# Patient Record
Sex: FEMALE | Race: WHITE | NOT HISPANIC OR LATINO | Employment: FULL TIME | URBAN - METROPOLITAN AREA
[De-identification: names, ages, dates, MRNs, and addresses within clinical notes are randomized per-mention and may not be internally consistent; named-entity substitution may affect disease eponyms.]

---

## 2018-11-05 ENCOUNTER — TRANSFERRED RECORDS (OUTPATIENT)
Dept: HEALTH INFORMATION MANAGEMENT | Facility: CLINIC | Age: 63
End: 2018-11-05

## 2018-12-13 ENCOUNTER — TRANSFERRED RECORDS (OUTPATIENT)
Dept: HEALTH INFORMATION MANAGEMENT | Facility: CLINIC | Age: 63
End: 2018-12-13

## 2019-09-18 ENCOUNTER — TRANSFERRED RECORDS (OUTPATIENT)
Dept: HEALTH INFORMATION MANAGEMENT | Facility: CLINIC | Age: 64
End: 2019-09-18

## 2019-09-23 ENCOUNTER — TELEPHONE (OUTPATIENT)
Dept: DERMATOLOGY | Facility: CLINIC | Age: 64
End: 2019-09-23

## 2019-09-23 ENCOUNTER — DOCUMENTATION ONLY (OUTPATIENT)
Dept: CARE COORDINATION | Facility: CLINIC | Age: 64
End: 2019-09-23

## 2019-11-01 ENCOUNTER — OFFICE VISIT (OUTPATIENT)
Dept: DERMATOLOGY | Facility: CLINIC | Age: 64
End: 2019-11-01
Payer: COMMERCIAL

## 2019-11-01 DIAGNOSIS — L23.9 ALLERGIC CONTACT DERMATITIS, UNSPECIFIED TRIGGER: Primary | ICD-10-CM

## 2019-11-01 RX ORDER — HYDROCORTISONE 25 MG/G
OINTMENT TOPICAL
COMMUNITY
Start: 2018-11-20

## 2019-11-01 RX ORDER — COMPRESSION SOCKS, MEDIUM
EACH MISCELLANEOUS
COMMUNITY
Start: 2018-01-24

## 2019-11-01 RX ORDER — LEVOTHYROXINE SODIUM 25 UG/1
25 TABLET ORAL
COMMUNITY
Start: 2019-09-06

## 2019-11-01 RX ORDER — RIZATRIPTAN BENZOATE 10 MG/1
TABLET ORAL
COMMUNITY
Start: 2019-08-07

## 2019-11-01 RX ORDER — METOPROLOL SUCCINATE 25 MG/1
25 TABLET, EXTENDED RELEASE ORAL
COMMUNITY
Start: 2019-05-16

## 2019-11-01 RX ORDER — BETAMETHASONE DIPROPIONATE 0.05 %
OINTMENT (GRAM) TOPICAL
COMMUNITY
Start: 2018-04-24

## 2019-11-01 RX ORDER — SIMVASTATIN 20 MG
TABLET ORAL
COMMUNITY
Start: 2019-10-20

## 2019-11-01 ASSESSMENT — PAIN SCALES - GENERAL: PAINLEVEL: NO PAIN (0)

## 2019-11-01 NOTE — NURSING NOTE
Dermatology Rooming Note    Kaley Magana's goals for this visit include:   Chief Complaint   Patient presents with     Rash     Kaley is here today for a rash that she has in multiple areas.      DENNY Mcgregor

## 2019-11-01 NOTE — TELEPHONE ENCOUNTER
FUTURE VISIT INFORMATION      FUTURE VISIT INFORMATION:    Date: 11.13.2019    Time: 11:00 a    Location:  Derm  REFERRAL INFORMATION:    Referring provider:  Dr. Lundberg    Referring providers clinic:  Derm    Reason for visit/diagnosis  New Allergy Consult     RECORDS REQUESTED FROM:       Clinic name Comments Records Status Imaging Status   Derm 11.01.2019 - Dr. Lundberg [facility] N/A

## 2019-11-01 NOTE — LETTER
11/1/2019       RE: Kaley Magana  [address] [place] [address]     Dear Colleague,    Thank you for referring your patient, Kaley Magana, to the [facility] DERMATOLOGY at [facility]. Please see a copy of my visit note below.    [facility] Dermatology Note      Dermatology Problem List:  1. Suspected allergic contact dermatitis  - Current treatment: betamethasone 0.05% cream    Encounter Date: Nov 1, 2019    CC:  Chief Complaint   Patient presents with     Rash     Kaley is here today for a rash that she has in multiple areas.          History of Present Illness:  Ms. Kaley Magana is a 64 year old female who presents as a referral from Dr. Katrina Magdaleno at [facility]. She has had vesicles off and on for two years that appear on her distal dorsal hands and periorally that erode and drain, and are pruritic and burn. The lesions do not have a seasonal pattern. She has tried vaseline, betamethasone 0.05% cream, cotton gloves, and switching to all hypoallergenic products, none of which she thinks has helped. She has noticed that she gets more perioral blisters if she eats citrus or spicy foods, so she now avoids preparing and eating these foods. Denies use of packaged cleansing wipes.   She uses gloves when she wipes down her desk at work with Chlorox wipes (she works at the  at a PT office). She also has a history of venous stasis dermatitis of the legs. Other medical history includes hypertension, hyperlipidemia, cataract surgery, and hypothyroid. Medications include metoprolol, simvastatin, and synthroid. She enjoys reading and she has a garden on her patio.    Past Medical History:   There is no problem list on file for this patient.    No past medical history on file.  No past surgical history on file.    Social History:  Patient reports that she has never smoked. She has never used smokeless tobacco. She works at the   at a PT office.    Family History:  No family history on file. Patient states her sister had skin cancer. Denies any other dermatological or autoimmune disease in the family.     Medications:  Current Outpatient Medications   Medication Sig Dispense Refill     betamethasone dipropionate (DIPROSONE) 0.05 % external ointment        Elastic Bandages & Supports (FUTURO RESTORING DRESS SOCKS) MISC Use daily for leg swelling.       hydrocortisone 2.5 % ointment        levothyroxine (SYNTHROID/LEVOTHROID) 25 MCG tablet Take 25 mcg by mouth       metoprolol succinate ER (TOPROL-XL) 25 MG 24 hr tablet Take 25 mg by mouth       rizatriptan (MAXALT) 10 MG tablet        simvastatin (ZOCOR) 20 MG tablet        Allergies   Allergen Reactions     Amlodipine Swelling     Doxycycline Rash     Sulfamethoxazole-Trimethoprim Rash         Review of Systems:  -As per HPI  -Allergy/Immunology: No history of nickel or other skin allergy. No history of asthma. Has mild seasonal allergies.  -Const: Denies fevers, chills or changes in weight.   -Heme: Has had night sweats for 6-8 months.  -Constitutional: Otherwise feeling well today, in usual state of health.  -HEENT: Patient denies nonhealing oral sores.  -Skin: As above in HPI. No additional skin concerns.    Physical exam:  Vitals: There were no vitals taken for this visit.  GEN: This is a well developed, well-nourished female in no acute distress, in a pleasant mood.    SKIN: Focused examination of the face, lips, eyelids, hands, fingernails, forearms, arms was performed.  -Allan skin type: I  -There are pinpoint eroded papules on the dorsal finger distal to the DIP joints and on the knuckles.  -There are linear erosions along the skin folds of the hands.  -The nails are longitudinally ridges with some horizontal ridging.  -There are multiple tan stuck on papules on the L medial arm, some of which are erythematous.  -No other lesions of concern on areas examined.      Impression/Plan:  1. Suspect allergic contact dermatitis.  Possible etiologies include cosmetics, shampoo and household cleaning products.    Referral to Dr. Pace for patch testing    Avoid liquid soaps, use bar soaps    Continue to use betamethasone cream as needed    CC Katrina Magdaleno MD  [facility]  [address] [place] [address] on close of this encounter.  Follow-up prn for new or changing lesions.     Staff Involved:  I, Patty Wagner, MS3, saw and examined the patient in the presence of Dr. Osmany Lundberg MD.  The entire visit (except PFSH) was performed by myself with the student acting as a scribe.I verified the history, examined the patient, discussed the above assessment with her and edited the clinical encounter note.  I agree with the treatment plan.  Osmany Lundberg MD  FAAD         Again, thank you for allowing me to participate in the care of your patient.      Sincerely,    Osmany Lundberg MD

## 2019-11-01 NOTE — PROGRESS NOTES
[facility] Dermatology Note      Dermatology Problem List:  1. Suspected allergic contact dermatitis  - Current treatment: betamethasone 0.05% cream    Encounter Date: Nov 1, 2019    CC:  Chief Complaint   Patient presents with     Rash     Kaley is here today for a rash that she has in multiple areas.          History of Present Illness:  Ms. Kaley Magana is a 64 year old female who presents as a referral from Dr. Katrina Magdaleno at [facility]. She has had vesicles off and on for two years that appear on her distal dorsal hands and periorally that erode and drain, and are pruritic and burn. The lesions do not have a seasonal pattern. She has tried vaseline, betamethasone 0.05% cream, cotton gloves, and switching to all hypoallergenic products, none of which she thinks has helped. She has noticed that she gets more perioral blisters if she eats citrus or spicy foods, so she now avoids preparing and eating these foods. Denies use of packaged cleansing wipes.   She uses gloves when she wipes down her desk at work with Chlorox wipes (she works at the  at a PT office). She also has a history of venous stasis dermatitis of the legs. Other medical history includes hypertension, hyperlipidemia, cataract surgery, and hypothyroid. Medications include metoprolol, simvastatin, and synthroid. She enjoys reading and she has a garden on her patio.    Past Medical History:   There is no problem list on file for this patient.    No past medical history on file.  No past surgical history on file.    Social History:  Patient reports that she has never smoked. She has never used smokeless tobacco. She works at the  at a PT office.    Family History:  No family history on file. Patient states her sister had skin cancer. Denies any other dermatological or autoimmune disease in the family.     Medications:  Current Outpatient Medications   Medication Sig Dispense Refill     betamethasone  dipropionate (DIPROSONE) 0.05 % external ointment        Elastic Bandages & Supports (FUTURO RESTORING DRESS SOCKS) MISC Use daily for leg swelling.       hydrocortisone 2.5 % ointment        levothyroxine (SYNTHROID/LEVOTHROID) 25 MCG tablet Take 25 mcg by mouth       metoprolol succinate ER (TOPROL-XL) 25 MG 24 hr tablet Take 25 mg by mouth       rizatriptan (MAXALT) 10 MG tablet        simvastatin (ZOCOR) 20 MG tablet        Allergies   Allergen Reactions     Amlodipine Swelling     Doxycycline Rash     Sulfamethoxazole-Trimethoprim Rash         Review of Systems:  -As per HPI  -Allergy/Immunology: No history of nickel or other skin allergy. No history of asthma. Has mild seasonal allergies.  -Const: Denies fevers, chills or changes in weight.   -Heme: Has had night sweats for 6-8 months.  -Constitutional: Otherwise feeling well today, in usual state of health.  -HEENT: Patient denies nonhealing oral sores.  -Skin: As above in HPI. No additional skin concerns.    Physical exam:  Vitals: There were no vitals taken for this visit.  GEN: This is a well developed, well-nourished female in no acute distress, in a pleasant mood.    SKIN: Focused examination of the face, lips, eyelids, hands, fingernails, forearms, arms was performed.  -Allan skin type: I  -There are pinpoint eroded papules on the dorsal finger distal to the DIP joints and on the knuckles.  -There are linear erosions along the skin folds of the hands.  -The nails are longitudinally ridges with some horizontal ridging.  -There are multiple tan stuck on papules on the L medial arm, some of which are erythematous.  -No other lesions of concern on areas examined.     Impression/Plan:  1. Suspect allergic contact dermatitis.  Possible etiologies include cosmetics, shampoo and household cleaning products.    Referral to Dr. Pace for patch testing    Avoid liquid soaps, use bar soaps    Continue to use betamethasone cream as needed    KEDAR YUEN  MD Sharla  [facility]  [address] [place] [address] on close of this encounter.  Follow-up prn for new or changing lesions.     Staff Involved:  I, Patty Wagner, MS3, saw and examined the patient in the presence of Dr. Osmany Lundberg MD.  The entire visit (except PFSH) was performed by myself with the student acting as a scribe.I verified the history, examined the patient, discussed the above assessment with her and edited the clinical encounter note.  I agree with the treatment plan.  Osmany Lundberg MD  FAAD

## 2019-11-13 ENCOUNTER — OFFICE VISIT (OUTPATIENT)
Dept: ALLERGY | Facility: CLINIC | Age: 64
End: 2019-11-13
Payer: COMMERCIAL

## 2019-11-13 ENCOUNTER — PRE VISIT (OUTPATIENT)
Dept: ALLERGY | Facility: CLINIC | Age: 64
End: 2019-11-13

## 2019-11-13 DIAGNOSIS — Z88.9 ATOPY: ICD-10-CM

## 2019-11-13 DIAGNOSIS — J30.2 SEASONAL ALLERGIC RHINITIS, UNSPECIFIED TRIGGER: ICD-10-CM

## 2019-11-13 DIAGNOSIS — L23.89 ALLERGIC CONTACT DERMATITIS DUE TO OTHER AGENTS: Primary | ICD-10-CM

## 2019-11-13 ASSESSMENT — PAIN SCALES - GENERAL: PAINLEVEL: NO PAIN (0)

## 2019-11-13 NOTE — PATIENT INSTRUCTIONS
Patch Testing Information  What are allergen patch tests?    The test is done to look for skin allergies that may be causing rashes and irritation.    A patch test is a way of identifying whether a substance has caused a delayed reaction with skin inflammation, such as contact eczema or delayed (after days) reactions to drugs.     We will use various types of test compounds, which may include common allergens you may come in contact with in daily life such as preservatives, fragrances or even drugs.     Most of the time we will use standardized, prefabricated test solutions. The choice of the substances and series tested will depend on your history of reactions. Sometimes we will test your own products as well.     In order to avoid severe toxic reactions we need detailed information or safety sheets for each of the test compounds.    The test panels are set up with a small amount of common substances that cause skin allergies. They are taped to your skin with a clear hypoallergenic bandage and reinforced hypoallergenic tape.    The substances are numbered, so it is easy to tell what is causing a skin reaction.  What do I need to do in preparation for the test?    Stop all systemic steroids 1 month prior to the testing.     Stop applying topical steroids to the test area one week prior to the test. It is to use them elsewhere throughout the testing process. If this is not possible then discuss options with the Allergy specialist.    Do not go sunbathing or tanning for one week prior to testing    It is okay to take antihistamines as normal.     Please wear dark colors the week of the test since we will write on you with a dark marker that may transfer and stain clothing or bedding.     Some medications can affect the reactions to allergens during the tests. Therefore reveal all the medications you take to the allergy team. The doctor will discuss the medications with you before the tests.     Eat how you normally  would.    Avoid the following:    You cannot get the test area wet during the entire test period. This means no bathing or swimming the entire test period.    No strenuous exercise that may cause sweating.    Do not scratch the test area, this can cause the allergen to spread and give us false positives.     Avoid exposure to UV irradiation. This means no tanning or UV treatment during the testing period.   What can I expect?    Patch testing is done over three appointments.   o The usual schedule is: Monday (Allergen patches are placed), Wednesday (Patches are removed and skin is examined by the MD), and Friday (Skin is examined by the MD)      If you are allergic, there will be an area of irritation where the test was placed.    Itching or burning at the test site might happen if you are allergic to the allergen.  Do not rub or scratch the test site since this may spread the allergen and possibly cause false positives. If itching or burning is not tolerable please contact the clinic.    The marker we draw on your back with ma take up to 5 weeks to wash off completely. Rubbing alcohol can help speed up this process.     Reactions can occur 1 to 2 weeks after the tests are applied. If this happens please take photos of the area and contact the clinic.  What should I do after the tests are placed?    Keep the area dry. No showering or getting the test area wet from the time we see you at your first visit until after your third appointment. If you get the test area wet you are washing off the test and we could get false negative reactions.    If you notice any of the test patches coming loose put on more tape to re-secure the test.    If the marker that is applied fades you can use a dark pen to carolyn around the panel sites.    Cover the test area when you are outside to avoid any sun exposure while the test is in place.     You can remove the tests only if there is a severe reaction (itch, pain, blistering). Please  report this to your doctor immediately. If you have to remove the tests please carolyn the locations of each test field with a grid so we can identify the allergen.  WHAT ARE THE POSSIBLE RISKS OF PATCH TESTS     If you are allergic to a compound tested you will develop a localized skin reaction similar to your previous reaction, this may take days to develop. These reactions include a formation of red, itchy skin lesions that could be about a centimeter with small vesicles or possibly even blisters. The lesions will fade over time, this may take weeks. The test might leave some skin pigmentation for a few months.     In rare cases a localized reaction to patch testing can become generalized.     The tests with your own products might have some risks because they are not standardized and unanticipated reactions could occur. We need as much information as possible to evaluate if your own products are safe to test and under what conditions. This has to be evaluated for each individual product.   Useful Contact Information    To contact your doctor you can either send a [employer] message or call [phone]    Address  o [address] [place] [address], [address]    If you develop any serious or adverse allergic reaction after office hours please seek immediate medical assistance at the nearest clinic, urgent care, or emergency room.

## 2019-11-13 NOTE — LETTER
11/13/2019         RE: Kaley Magana  [address] [place] [address]        Dear Colleague,    Thank you for referring your patient, Kaley Magana, to the [facility] ALLERGY. Please see a copy of my visit note below.    [facility] Allergy Note  1. Acute and subacute allergic dermatitis  -On hands, arms, perioral, abdomen  -Possibly allergic contact dermatitis    Allergy Problem List:    Specialty Problems     None          CC:   Allergy Consult (Kaley is here for Dermatitis. Referred by . Possible Patch Testing. Fingers, elbows, armpits and stomach have itching and burning.)        Encounter Date: Nov 13, 2019    History of Present Illness:  Ms. Kaley Magana is a 64 year old female who presents as a referral from [facility]. She has had eczema on her hands, abdomen, arms, and around her mouth for the last 2 years. Was started on lisinopril in 2017 to control her blood pressure and she developed leg edema as well as spots on her legs.   A biopsy was taken which revealed venous stasis dermatitis and the lisinopril was discontinued. Currently uses metoprolol and wears compression stockings. While the venous stasis resolved, she developed eczema on other parts of her body. Treats until eczema is resolved but develops eczema again a few days later. She has been using betamethasone 0.05% ointment 2-4 times per day for 3-4 times per week for over a year. She currently has eczema on her left abdomen, left upper arm, right elbow, hands, and under her left nose.   Has eliminated all fragrances from her hygiene care and clothing care to see if there would be any improvement but did not notice any changes. Has tried wearing cotton gloves with lotion with no improvement. She reports that it seems using lotion causes more irritation than improvement. Has tried using Vaseline alone and with betamethasone with no improvement. Has tried continuing to apply betamethasone to areas of eczema  for a few days after resolution without any change in the amount of time for the eczema to reappear. Reports that she has developed blisters with yellow secretions which she took a picture of on her phone. Denies having asthma or allergies.   Has rhinoconjunctivitis throughout the fall which started a few years ago.   Has noticed hand irritation after wearing rubber gloves. Stress causes her eczema to worsen. Dyes hair. Does not pain nails. Daughter has severe allergies.   Patient brought in a list of current medications, supplements, and lotions as of November 2019: metoprolol 25 mg (2+ years), simvastatin 20mg (4+ years, synthroid 25 mcg (3 months), Maxalt 10mg (5+ years prn), betamethasone 0.05% ointment (2+ years, may just be 1+ years), hydrocortisone 2.5% (1+ years), [employer] eye vitamin, multivitamin, biotin, calcium/vitamin D, Blink eye drops, antacids for acid reflux, CeraVe, Vanicream, and Sarna lotion.    Past Medical History:   There is no problem list on file for this patient.    No past medical history on file.    Allergy History:     Allergies   Allergen Reactions     Amlodipine Swelling     Doxycycline Rash     Sulfamethoxazole-Trimethoprim Rash       Social History:  The patient works at the  of a physical therapy office. Patient has the following hobbies or non-occupational exposure: reads, enjoys being on her porch particularly when it is summer.     reports that she has never smoked. She has never used smokeless tobacco.      Family History:  No family history on file.    Medications:  Current Outpatient Medications   Medication Sig Dispense Refill     betamethasone dipropionate (DIPROSONE) 0.05 % external ointment        Elastic Bandages & Supports (FUTURO RESTORING DRESS SOCKS) MISC Use daily for leg swelling.       hydrocortisone 2.5 % ointment        levothyroxine (SYNTHROID/LEVOTHROID) 25 MCG tablet Take 25 mcg by mouth       metoprolol succinate ER (TOPROL-XL) 25 MG 24 hr tablet  Take 25 mg by mouth       rizatriptan (MAXALT) 10 MG tablet        simvastatin (ZOCOR) 20 MG tablet              Review of Systems:  -As per HPI  -Constitutional: The patient denies fatigue, fevers, chills, unintended weight loss, and night sweats.  -HEENT: Patient denies nonhealing oral sores.  -Skin: As above in HPI. No additional skin concerns.    Physical exam:  Vitals: There were no vitals taken for this visit.  GEN: This is a well developed, well-nourished female in no acute distress, in a pleasant mood.    SKIN: Focused examination of the hands, arms, face, and abdomen was performed.  -Erythematous patches of eczema with lichenification on back of fingers and hands.  -Mild erythematous patches of eczema as well as some nummular eczema throughout arms on extensor surface, underneath left nasal ala, and under flanks on abdomen  -No other lesions of concern on areas examined.     Allergy Tests:    Order for PATCH TESTS    [] Outpatient  [] Inpatient: [facility]..../ Bed ....      Skin Atopy (atopic dermatitis) [] Yes   [x] No  Rhinitis/Sinusitis:   [x] Yes   [] No Fall  Allergic Asthma:   [] Yes   [x] No  Food Allergy:   [] Yes   [x] No  Leg ulcers:   [] Yes   [x] No  Hand eczema:   [x] Yes   [] No   Leading hand:   [x] R   [] L       [] Ambidextrous                        Reason for tests (suspected allergy): acute to subacute dermatitis on back of hands, extensor part of arms and perioral  Known previous allergies: none    Standardized panels  [x] Standard panel (40 tests)  [x] Preservatives & Antimicrobials (31 tests)  [x] Emulsifiers & Additives (25 tests)   [x] Perfumes/Flavours & Plants (25 tests)  [x] Hairdresser panel (12 tests)  [x] Rubber Chemicals (22 tests)  [] Plastics (26 tests)  [] Colorants/Dyes/Food additives (20 tests)  [] Metals (implants/dental) (24 tests)  [] Local anaesthetics/NSAIDs (13 tests)  [] Antibiotics & Antimycotics (14 tests)   [] Corticosteroids (15 tests)   [] Photopatch test (62  tests)   [] others: ...      [] Patient's own products: ...    DO NOT test if chemical or biological identity is unknown!     always ask from patient the product information and safety sheets (MSDS)   [x] Atopy screen prick test (Atopic predisposition): ...    [] Patient needs consultation with Allergy team (changes of tests may apply)  [] Tests discussed with Allergy team (can have direct appointment for patch tests)    Impression/Plan:    >>Acute and subacute allergic dermatitis, possibly contact allergic dermatitis      Follow-up on 11/25/2019 for 1st day of patch test.    Staff Physician Comments:  I was present with the medical student who participated in the service and in the documentation of the note. I have verified the history and personally performed the physical exam and medical decision making. I agree with the assessment and plan as documented in the note. I have reviewed and if necessary amended the note.      Castillo Pace MD  Professor  Head of Dermato-Allergy Division  Department of Dermatology  [facility]      I spent a total of 25 minutes face to face with Kaley Izzy during today s office visit. Over 50% of this time was spent counseling the patient and/or coordinating care.  Please see Assessment and Plan for details.    Again, thank you for allowing me to participate in the care of your patient.        Sincerely,        Castillo Pace MD

## 2019-11-13 NOTE — NURSING NOTE
Chief Complaint   Patient presents with     Allergy Consult     Kaley is here for Dermatitis. Referred by . Possible Patch Testing. Fingers, elbows, armpits and stomach have itching and burning.     ARACELIS JONES on 11/13/2019 at 11:14 AM

## 2019-11-14 NOTE — PROGRESS NOTES
[facility] Allergy Note  1. Acute and subacute allergic dermatitis  -On hands, arms, perioral, abdomen  -Possibly allergic contact dermatitis    Allergy Problem List:    Specialty Problems     None          CC:   Allergy Consult (Kaley is here for Dermatitis. Referred by . Possible Patch Testing. Fingers, elbows, armpits and stomach have itching and burning.)        Encounter Date: Nov 13, 2019    History of Present Illness:  Ms. Kaley Magana is a 64 year old female who presents as a referral from [facility]. She has had eczema on her hands, abdomen, arms, and around her mouth for the last 2 years. Was started on lisinopril in 2017 to control her blood pressure and she developed leg edema as well as spots on her legs.   A biopsy was taken which revealed venous stasis dermatitis and the lisinopril was discontinued. Currently uses metoprolol and wears compression stockings. While the venous stasis resolved, she developed eczema on other parts of her body. Treats until eczema is resolved but develops eczema again a few days later. She has been using betamethasone 0.05% ointment 2-4 times per day for 3-4 times per week for over a year. She currently has eczema on her left abdomen, left upper arm, right elbow, hands, and under her left nose.   Has eliminated all fragrances from her hygiene care and clothing care to see if there would be any improvement but did not notice any changes. Has tried wearing cotton gloves with lotion with no improvement. She reports that it seems using lotion causes more irritation than improvement. Has tried using Vaseline alone and with betamethasone with no improvement. Has tried continuing to apply betamethasone to areas of eczema for a few days after resolution without any change in the amount of time for the eczema to reappear. Reports that she has developed blisters with yellow secretions which she took a picture of on her phone. Denies having asthma or  allergies.   Has rhinoconjunctivitis throughout the fall which started a few years ago.   Has noticed hand irritation after wearing rubber gloves. Stress causes her eczema to worsen. Dyes hair. Does not pain nails. Daughter has severe allergies.   Patient brought in a list of current medications, supplements, and lotions as of November 2019: metoprolol 25 mg (2+ years), simvastatin 20mg (4+ years, synthroid 25 mcg (3 months), Maxalt 10mg (5+ years prn), betamethasone 0.05% ointment (2+ years, may just be 1+ years), hydrocortisone 2.5% (1+ years), [employer] eye vitamin, multivitamin, biotin, calcium/vitamin D, Blink eye drops, antacids for acid reflux, CeraVe, Vanicream, and Sarna lotion.    Past Medical History:   There is no problem list on file for this patient.    No past medical history on file.    Allergy History:     Allergies   Allergen Reactions     Amlodipine Swelling     Doxycycline Rash     Sulfamethoxazole-Trimethoprim Rash       Social History:  The patient works at the  of a physical therapy office. Patient has the following hobbies or non-occupational exposure: reads, enjoys being on her porch particularly when it is summer.     reports that she has never smoked. She has never used smokeless tobacco.      Family History:  No family history on file.    Medications:  Current Outpatient Medications   Medication Sig Dispense Refill     betamethasone dipropionate (DIPROSONE) 0.05 % external ointment        Elastic Bandages & Supports (FUTURO RESTORING DRESS SOCKS) MISC Use daily for leg swelling.       hydrocortisone 2.5 % ointment        levothyroxine (SYNTHROID/LEVOTHROID) 25 MCG tablet Take 25 mcg by mouth       metoprolol succinate ER (TOPROL-XL) 25 MG 24 hr tablet Take 25 mg by mouth       rizatriptan (MAXALT) 10 MG tablet        simvastatin (ZOCOR) 20 MG tablet              Review of Systems:  -As per HPI  -Constitutional: The patient denies fatigue, fevers, chills, unintended weight loss,  and night sweats.  -HEENT: Patient denies nonhealing oral sores.  -Skin: As above in HPI. No additional skin concerns.    Physical exam:  Vitals: There were no vitals taken for this visit.  GEN: This is a well developed, well-nourished female in no acute distress, in a pleasant mood.    SKIN: Focused examination of the hands, arms, face, and abdomen was performed.  -Erythematous patches of eczema with lichenification on back of fingers and hands.  -Mild erythematous patches of eczema as well as some nummular eczema throughout arms on extensor surface, underneath left nasal ala, and under flanks on abdomen  -No other lesions of concern on areas examined.     Allergy Tests:    Order for PATCH TESTS    [] Outpatient  [] Inpatient: [facility]..../ Bed ....      Skin Atopy (atopic dermatitis) [] Yes   [x] No  Rhinitis/Sinusitis:   [x] Yes   [] No Fall  Allergic Asthma:   [] Yes   [x] No  Food Allergy:   [] Yes   [x] No  Leg ulcers:   [] Yes   [x] No  Hand eczema:   [x] Yes   [] No   Leading hand:   [x] R   [] L       [] Ambidextrous                        Reason for tests (suspected allergy): acute to subacute dermatitis on back of hands, extensor part of arms and perioral  Known previous allergies: none    Standardized panels  [x] Standard panel (40 tests)  [x] Preservatives & Antimicrobials (31 tests)  [x] Emulsifiers & Additives (25 tests)   [x] Perfumes/Flavours & Plants (25 tests)  [x] Hairdresser panel (12 tests)  [x] Rubber Chemicals (22 tests)  [] Plastics (26 tests)  [] Colorants/Dyes/Food additives (20 tests)  [] Metals (implants/dental) (24 tests)  [] Local anaesthetics/NSAIDs (13 tests)  [] Antibiotics & Antimycotics (14 tests)   [] Corticosteroids (15 tests)   [] Photopatch test (62 tests)   [] others: ...      [] Patient's own products: ...    DO NOT test if chemical or biological identity is unknown!     always ask from patient the product information and safety sheets (MSDS)   [x] Atopy screen prick test  (Atopic predisposition): ...    [] Patient needs consultation with Allergy team (changes of tests may apply)  [] Tests discussed with Allergy team (can have direct appointment for patch tests)    Impression/Plan:    >>Acute and subacute allergic dermatitis, possibly contact allergic dermatitis      Follow-up on 11/25/2019 for 1st day of patch test.    Staff Physician Comments:  I was present with the medical student who participated in the service and in the documentation of the note. I have verified the history and personally performed the physical exam and medical decision making. I agree with the assessment and plan as documented in the note. I have reviewed and if necessary amended the note.      Castillo Pace MD  Professor  Head of Dermato-Allergy Division  Department of Dermatology  [facility]      I spent a total of 25 minutes face to face with Kaley Magana during today s office visit. Over 50% of this time was spent counseling the patient and/or coordinating care.  Please see Assessment and Plan for details.

## 2019-11-18 ENCOUNTER — PRE VISIT (OUTPATIENT)
Dept: DERMATOLOGY | Facility: CLINIC | Age: 64
End: 2019-11-18

## 2019-11-18 NOTE — TELEPHONE ENCOUNTER
Order documented by: ARACELIS JONES  Order reviewed by: Mimi Montenegro LPN   Physician:   Date/time of application: 11/25/2019  Localization of application: Back     STANDARD Series         No Substance 2 days 4 days remarks   1 Jayy Mix [C] - -    2 Colophony - -    3  2-Mercaptobenzothiazole  - -     4 Methylisothiazolinone - -    5 Carba Mix - -    6 Thiuram Mix [A] - -    7 Bisphenol A Epoxy Resin - -    8 W-Iqnq-Hijmneyphvu-Formaldehyde Resin - -    9 Mercapto Mix [A] - -    10 Black Rubber Mix- PPD [B] - -    11 Potassium Dichromate  -  -    12 Balsam of [place] (Myroxylon Pereirae Resin) - -    13 Nickel Sulphate Hexahydrate - -    14 Mixed Dialkyl Thiourea - -    15 Paraben Mix [B] - -    16 Methyldibromo Glutaronitrile - -    17 Fragrance Mix - -    18 2-Bromo-2-Nitropropane-1,3-Diol (Bronopol) - -    19 Lyral - -    20 Tixocortol-21- Pivalate - -    21 Diazolidiyl Urea (Germall II) - -    22 Methyl Methacrylate - -    23 Cobalt (II) Chloride Hexahydrate - -    24 Fragrance Mix II  - -    25 Compositae Mix - -    26 Benzoyl Peroxide - -    27 Bacitracin - -    28 Formaldehyde - -    29 Methylchloroisothiazolinone / Methylisothiazolinone - -    30 Corticosteroid Mix - -    31 Sodium Lauryl Sulfate - -    32 Lanolin Alcohol - -    33 Turpentine - -    34 Cetylstearylalcohol - -    35 Chlorhexidine Dicluconate - -    36 Budenoside - -    37 Imidazolidinyl Urea  - -    38 Ethyl-2 Cyanoacrylate - -    39 Quaternium 15 (Dowicil 200) - -    40 Decyl Glucoside - -      EMULSIFIERS & ADDITIVES        No Substance 2 days 4 days remarks   41 Polyethylene Glycol-400 - -    42 Cocamidopropyl Betaine - -    43 Amerchol L101 - -    44 Propylene Glycol - -    45 Triethanolamine - -    46 Sorbitane Sesquiolate - -    47 Isopropylmyristate - -    48 Polysorbate 80  - -    49 Amidoamine   (Stearamidopropyl Dimethylamine) - -    50 Oleamidopropyl Dimethylamine - -    51 Lauryl Glucoside - -    52 Coconut  Diethanolamide  - -    53 2-Hydroxy-4-Methoxy Benzophenone (Oxybenzone) - -    54 Benzophenone-4 (Sulisobenzon) - -    55 Propolis - -    56 Dexpanthenol - -    57 Carboxymethyl Cellulose Sodium - -    58 Abitol - -    59 Tert-Butylhydroquinone - -    60 Benzyl Salicylate - -     Antioxidant      61 Dodecyl Gallate - -    62 Butylhydroxyanisole (BHA) - -    63 Butylhydroxytoluene (BHT) - -    64 Di-Alpha-Tocopherol (Vit E) - -    65 Propyl Gallate - -      PERFUMES, FLAVORS & PLANTS         No Substance 2 days 4 days remarks   66 Benzyl Salicylate - -    67 Benzyl Cinnamate - -    68 Di-Limonene (Dipentene) - -    69 Cananga Odorata (Christopher White) (I) - -    70 Lichen Acid Mix - -    71 Mentha Piperita Oil (Peppermint Oil) - -    72 Sesquiterpenelactone mix - -    73 Tea Tree Oil, Oxidized - -    74 Wood Tar Mix - -    75 Abietic Acid - -    76 Lavendula Angustifolia Oil (Lavender Oil) - -    77 Camphor  - -     Fragrance Mix I      78 Oakmoss Absolute - -    79 Eugenol - -    80 Geraniol - -    81 Hydroxycitronellal - -    82 Isoeugenol - -    83 Cinnamic Aldehyde - -    84 Cinnamic Alcohol  - -    85 Sorbitane Sesquioleate - -     Fragrance mix II      86 Citronellol - -    87 Alpha-Hexylcinnamic Aldehyde    - -    88 Citral - -    89 Farnesol - -    90 Coumarin - -      PRESERVATIVES & ANTIMICROBIALS         No Substance 2 days 4 days remarks   91  1,2-Benzisothiazoline-3-One, Sodium Salt - -    92  1,3,5-Gordon (2-Hydroxyethyl) - Hexahydrotriazine (Grotan BK) - -    93 7-Assswfnoozeic-0-Nitro-1, 3-Propanediol - -    94  3, 4, 4' - Triclocarban - -    95 4 - Chloro - 3 - Cresol - -    96 4 - Chloro - 4 - Xylenol (PCMX) - -    97 7-Ethylbicyclooxazolidine (Bioban PR9536) - -    98 Benzalkonium Chloride - -    99 Benzyl Alcohol - -    100 Cetalkonium Chloride - -    101 Cetylpyrimidine Chloride  - -    102 Chloroacetamide - -    103 DMDM Hydantoin - -    104 Glutaraldehyde - -    105 Triclosan - -    106 Glyoxal  Trimeric Dihydrate - -    107 Iodopropynyl Butylcarbamate - -    108 Octylisothiazoline - -    109 Iodoform - -    110 (Nitrobutyl) Morpholine/(Ethylnitro-Trimethylene) Dimorpholine (Bioban P 1487) - -    111 Phenoxyethanol - -    112 Phenyl Salicylate - -    113 Povidone Iodine - -    114 Sodium Benzoate - -    115 Sodium Disulfite - -    116 Sorbic Acid - -    117 Thimerosal - -     Parabens      118 Butyl-P-Hydroxybenzoate - -    119 Ethyl-P-Hydroxybenzoate - -    120 Methyl-P-Hydroxybenzoate - -    121 Propyl-P-Hydroxybenzoate - -      RUBBER CHEMICALS         No Substance 2 days 4 days remarks    Carbamate      122 Zinc Bis ( Diethyldithio carbamate ) (ZDEC) - -    123 Zinc Bis (Dibutyldithiocarbamate) - -    124 1,3-Diphenylguanidine (DPG) - -     Thiourea      125 Dibutylthiourea - -    126 Diphenyltiourea - -    127 Thiourea - -     Mercapto chemicals      128 Morpholinyl Mercaptobenzothiazole - -    129 B-Cddgbkjeow-5-Benzothiazyl-Sulfenamide - -    130 Dibenzothiazyl Disulfide - -     Thiuram chemicals      131 Dipentamethylenethiuram Disulfide - -    132 Tetraethylthiuram Disulfide (Disulfiram) - -    133 Tetramethylthiuram Disulfide - -    134 Tetramethyl Thiuram Monosulfide (TMTM) - -     4-Phenylenediamine derivatives      135 N-Isopropyl-N'-Phenyl-P-Phenylenediamine (IPPD) - -    136 Fhhhoxpz-R-Uiffazxvezchqnwm (DPPD) - -     Various Rubber Additives      137 Hydroquinone Monobenzylether  - -    138 Hexamethylenetetramine - -    139 4,4'-Dihydroxybiphenyl - -    140 Cyclohexylthiophtalimide - -    141 Ethylenediamine Dihydrochloride - -    142 N-Phenyl-B-Naphthylamine - -    143 Dodecyl Mercaptan - -      HAIRDRESSER Series         No Substance 2 days 4 days remarks   144 P-Phenylenediamin  -  -    145 P-Toluenediamine Sulphate  -  -    146 Ammonium Thioglycolate - -    147 Ammonium Persulfate - -    148 Resorcinol - -    149 3-Aminophenol - -    150 P-Aminophenol - -    151 Glyceryl Monothioglycolate  - -    152 Zinc Pyrithione  - -    153 Pyrogallol - -    154 P-Aminodiphenylamine - -    155 Dimethylaminopropylamin (DMAPA) - -      Results of patch tests:                         Interpretation:    - Negative                    A    = Allergic      (+) Erythema    TI   = Toxic/irritant   + E + Infiltration    RaP = Relevance at Present     ++ E/I + Papulovesicle   Rpr  = Relevance Previously     +++ E/I/P + Blister     nR   = No Relevance

## 2019-11-25 ENCOUNTER — OFFICE VISIT (OUTPATIENT)
Dept: ALLERGY | Facility: CLINIC | Age: 64
End: 2019-11-25
Payer: COMMERCIAL

## 2019-11-25 DIAGNOSIS — Z88.9 ATOPY: ICD-10-CM

## 2019-11-25 DIAGNOSIS — L23.89 ALLERGIC CONTACT DERMATITIS DUE TO OTHER AGENTS: Primary | ICD-10-CM

## 2019-11-25 ASSESSMENT — PAIN SCALES - GENERAL: PAINLEVEL: NO PAIN (0)

## 2019-11-25 NOTE — PROGRESS NOTES
[facility] Allergy Note    Allergy Clinic  Formerly [facility]  [address] [place] [id number]    Allergy Problem List:  1. Acute and subacute allergic dermatitis  -On hands, arms, perioral, abdomen  -Possibly allergic contact dermatitis    Allergy Problem List:    Specialty Problems     None        CC:   Allergy Testing Followup (Patch testing day #1)      Encounter Date: Nov 25, 2019    History of Present Illness:  Ms. Kaley Magana is a 64 year old female who presents as a referral from [facility]. She has had eczema on her hands, abdomen, arms, and around her mouth for the last 2 years. Was started on lisinopril in 2017 to control her blood pressure and she developed leg edema as well as spots on her legs.   A biopsy was taken which revealed venous stasis dermatitis and the lisinopril was discontinued. Currently uses metoprolol and wears compression stockings. While the venous stasis resolved, she developed eczema on other parts of her body. Treats until eczema is resolved but develops eczema again a few days later. She has been using betamethasone 0.05% ointment 2-4 times per day for 3-4 times per week for over a year. She currently has eczema on her left abdomen, left upper arm, right elbow, hands, and under her left nose.   Has eliminated all fragrances from her hygiene care and clothing care to see if there would be any improvement but did not notice any changes. Has tried wearing cotton gloves with lotion with no improvement. She reports that it seems using lotion causes more irritation than improvement. Has tried using Vaseline alone and with betamethasone with no improvement. Has tried continuing to apply betamethasone to areas of eczema for a few days after resolution without any change in the amount of time for the eczema to reappear. Reports that she has developed blisters with yellow secretions which she took a picture of on her phone.  Denies having asthma or allergies.   Has rhinoconjunctivitis throughout the fall which started a few years ago.   Has noticed hand irritation after wearing rubber gloves. Stress causes her eczema to worsen. Dyes hair. Does not pain nails. Daughter has severe allergies.   Patient brought in a list of current medications, supplements, and lotions as of November 2019: metoprolol 25 mg (2+ years), simvastatin 20mg (4+ years, synthroid 25 mcg (3 months), Maxalt 10mg (5+ years prn), betamethasone 0.05% ointment (2+ years, may just be 1+ years), hydrocortisone 2.5% (1+ years), [employer] eye vitamin, multivitamin, biotin, calcium/vitamin D, Blink eye drops, antacids for acid reflux, CeraVe, Vanicream, and Sarna lotion.    Past Medical History:   There is no problem list on file for this patient.    No past medical history on file.    Allergy History:     Allergies   Allergen Reactions     Amlodipine Swelling     Doxycycline Rash     Sulfamethoxazole-Trimethoprim Rash       Social History:  The patient works at the  of a physical therapy office. Patient has the following hobbies or non-occupational exposure: reads, enjoys being on her porch particularly when it is summer.     reports that she has never smoked. She has never used smokeless tobacco.      Family History:  No family history on file.    Medications:  Current Outpatient Medications   Medication Sig Dispense Refill     betamethasone dipropionate (DIPROSONE) 0.05 % external ointment        Elastic Bandages & Supports (FUTURO RESTORING DRESS SOCKS) MISC Use daily for leg swelling.       hydrocortisone 2.5 % ointment        levothyroxine (SYNTHROID/LEVOTHROID) 25 MCG tablet Take 25 mcg by mouth       metoprolol succinate ER (TOPROL-XL) 25 MG 24 hr tablet Take 25 mg by mouth       rizatriptan (MAXALT) 10 MG tablet        simvastatin (ZOCOR) 20 MG tablet              Review of Systems:  -As per HPI  -Constitutional: The patient denies fatigue, fevers, chills,  unintended weight loss, and night sweats.  -HEENT: Patient denies nonhealing oral sores.  -Skin: As above in HPI. No additional skin concerns.    Physical exam:  Vitals: There were no vitals taken for this visit.  GEN: This is a well developed, well-nourished female in no acute distress, in a pleasant mood.    SKIN: Focused examination of the hands, arms, face, and abdomen was performed.  -Erythematous patches of eczema with lichenification on back of fingers and hands.  -Mild erythematous patches of eczema as well as some nummular eczema throughout arms on extensor surface, underneath left nasal ala, and under flanks on abdomen  -No other lesions of concern on areas examined.     Allergy Tests:    Order for PATCH TESTS    [] Outpatient  [] Inpatient: [facility]..../ Bed ....      Skin Atopy (atopic dermatitis) [] Yes   [x] No  Rhinitis/Sinusitis:   [x] Yes   [] No Fall  Allergic Asthma:   [] Yes   [x] No  Food Allergy:   [] Yes   [x] No  Leg ulcers:   [] Yes   [x] No  Hand eczema:   [x] Yes   [] No   Leading hand:   [x] R   [] L       [] Ambidextrous                        Reason for tests (suspected allergy): acute to subacute dermatitis on back of hands, extensor part of arms and perioral  Known previous allergies: none    Standardized panels  [x] Standard panel (40 tests)  [x] Preservatives & Antimicrobials (31 tests)  [x] Emulsifiers & Additives (25 tests)   [x] Perfumes/Flavours & Plants (25 tests)  [x] Hairdresser panel (12 tests)  [x] Rubber Chemicals (22 tests)  [] Plastics (26 tests)  [] Colorants/Dyes/Food additives (20 tests)  [] Metals (implants/dental) (24 tests)  [] Local anaesthetics/NSAIDs (13 tests)  [] Antibiotics & Antimycotics (14 tests)   [] Corticosteroids (15 tests)   [] Photopatch test (62 tests)   [] others: ...      [] Patient's own products: ...    DO NOT test if chemical or biological identity is unknown!     always ask from patient the product information and safety sheets (MSDS)   [x] Atopy  screen prick test (Atopic predisposition): ...    [] Patient needs consultation with Allergy team (changes of tests may apply)  [] Tests discussed with Allergy team (can have direct appointment for patch tests)    Atopy Screen (Placed 11/25/19 )    No Substance Readings (15 min) Evaluation   POS Histamine 1mg/ml ++    NEG NaCl 0.9% -      No Substance Readings (15 min) Evaluation   1 Alternaria alternata (tenuis)  -    2 Cladosporium herbarum -    3 Aspergillus fumigatus -    4 Penicillium notatum -    5 Dermatophagoides pteronyssinus -    6 Dermatophagoides farinae -    7 Dog epithelium (canis spp) -    8 Cat hair (trinity catus) -    9 Cockroach   (Blatella americana & germanica) -    10 Grass mix midwest   (Nury, Orchard, Redtop, Raul) -    11 Cornel grass (sorghum halepense) -    12 Weed mix   (common Cocklebur, Lamb s quarters, rough redroot Pigweed, Dock/Sorrel) -    13 Mug wort (artemisia vulgare) -    14 Ragweed giant/short (ambrosia spp) -    15 English Plantain (plantago lanceolata) -    16 Tree mix 1 (Pecan, Maple BHR, Oak RVW, american [place], black [place]) -    17 Red cedar (juniperus virginia) -    18 Tree mix 2   (white Mathew, river/red Birch, black Lannon, common Culberson, american Elm) -    19 Box elder/Maple mix (acer spp) -    20 Palmetto shagbark (carya ovata) -       -      Conclusion: Negative atopy screen prick test. No atopic predisposition.    RESULTS & EVALUATION of PATCH TESTS    Patch test readings after     [x] 2 days, [] 3 days [x] 4 days, [] 5 days,    Applied patch tests with results (import here the list of patch tests):  Order documented by: ARACELIS JONES  Order reviewed by: Mimi Montenegro LPN   Physician:   Date/time of application: 11/25/2019  Localization of application: Back >>> no eczema or lesions     STANDARD Series         No Substance 2 days 4 days remarks   1 Jayy Mix [C] - -    2 Colophony - -    3  2-Mercaptobenzothiazole  - -     4  Methylisothiazolinone - -    5 Carba Mix - -    6 Thiuram Mix [A] - -    7 Bisphenol A Epoxy Resin - -    8 K-Yndh-Tdmhnwowmyj-Formaldehyde Resin - -    9 Mercapto Mix [A] - -    10 Black Rubber Mix- PPD [B] - -    11 Potassium Dichromate  -  -    12 Balsam of [place] (Myroxylon Pereirae Resin) - -    13 Nickel Sulphate Hexahydrate - -    14 Mixed Dialkyl Thiourea - -    15 Paraben Mix [B] - -    16 Methyldibromo Glutaronitrile - -    17 Fragrance Mix - -    18 2-Bromo-2-Nitropropane-1,3-Diol (Bronopol) - -    19 Lyral - -    20 Tixocortol-21- Pivalate - -    21 Diazolidiyl Urea (Germall II) - -    22 Methyl Methacrylate - -    23 Cobalt (II) Chloride Hexahydrate NA NA    24 Fragrance Mix II  - -    25 Compositae Mix - -    26 Benzoyl Peroxide - -    27 Bacitracin - -    28 Formaldehyde - -    29 Methylchloroisothiazolinone / Methylisothiazolinone - -    30 Corticosteroid Mix - -    31 Sodium Lauryl Sulfate - -    32 Lanolin Alcohol - -    33 Turpentine - -    34 Cetylstearylalcohol - -    35 Chlorhexidine Dicluconate - -    36 Budenoside - -    37 Imidazolidinyl Urea  - -    38 Ethyl-2 Cyanoacrylate NA NA    39 Quaternium 15 (Dowicil 200) - -    40 Decyl Glucoside - -      EMULSIFIERS & ADDITIVES        No Substance 2 days 4 days remarks   41 Polyethylene Glycol-400 - -    42 Cocamidopropyl Betaine - -    43 Amerchol L101 NA NA    44 Propylene Glycol - -    45 Triethanolamine - -    46 Sorbitane Sesquiolate - -    47 Isopropylmyristate - -    48 Polysorbate 80  - -    49 Amidoamine   (Stearamidopropyl Dimethylamine) - -    50 Oleamidopropyl Dimethylamine - -    51 Lauryl Glucoside - -    52 Coconut Diethanolamide  - -    53 2-Hydroxy-4-Methoxy Benzophenone (Oxybenzone) - -    54 Benzophenone-4 (Sulisobenzon) - -    55 Propolis - -    56 Dexpanthenol - -    57 Carboxymethyl Cellulose Sodium - -    58 Abitol - -    59 Tert-Butylhydroquinone - -    60 Benzyl Salicylate - -     Antioxidant      61 Dodecyl Gallate - -     62 Butylhydroxyanisole (BHA) - -    63 Butylhydroxytoluene (BHT) - -    64 Di-Alpha-Tocopherol (Vit E) - -    65 Propyl Gallate - -      PERFUMES, FLAVORS & PLANTS         No Substance 2 days 4 days remarks   66 Benzyl Salicylate - -    67 Benzyl Cinnamate - -    68 Di-Limonene (Dipentene) NA NA    69 Cananga Odorata (Christopher White) (I) - -    70 Lichen Acid Mix - -    71 Mentha Piperita Oil (Peppermint Oil) - -    72 Sesquiterpenelactone mix - -    73 Tea Tree Oil, Oxidized - -    74 Wood Tar Mix - -    75 Abietic Acid NA NA    76 Lavendula Angustifolia Oil (Lavender Oil) - -    77 Camphor  NA NA     Fragrance Mix I      78 Oakmoss Absolute - -    79 Eugenol - -    80 Geraniol - -    81 Hydroxycitronellal - -    82 Isoeugenol - -    83 Cinnamic Aldehyde - -    84 Cinnamic Alcohol  - -    85 Sorbitane Sesquioleate NA NA     Fragrance mix II      86 Citronellol - -    87 Alpha-Hexylcinnamic Aldehyde    - -    88 Citral - -    89 Farnesol - -    90 Coumarin - -      PRESERVATIVES & ANTIMICROBIALS         No Substance 2 days 4 days remarks   91  1,2-Benzisothiazoline-3-One, Sodium Salt - -    92  1,3,5-Gordon (2-Hydroxyethyl) - Hexahydrotriazine (Grotan BK) - -    93 1-Guaodqjmztlzl-6-Nitro-1, 3-Propanediol - -    94  3, 4, 4' - Triclocarban - -    95 4 - Chloro - 3 - Cresol - -    96 4 - Chloro - 4 - Xylenol (PCMX) - -    97 7-Ethylbicyclooxazolidine (Bioban UP5991) - -    98 Benzalkonium Chloride - -    99 Benzyl Alcohol - -    100 Cetalkonium Chloride - -    101 Cetylpyrimidine Chloride  - -    102 Chloroacetamide - -    103 DMDM Hydantoin - -    104 Glutaraldehyde - -    105 Triclosan NA NA    106 Glyoxal Trimeric Dihydrate - -    107 Iodopropynyl Butylcarbamate - -    108 Octylisothiazoline - -    109 Iodoform - -    110 (Nitrobutyl) Morpholine/(Ethylnitro-Trimethylene) Dimorpholine ([facility] P 1487) - -    111 Phenoxyethanol - -    112 Phenyl Salicylate - -    113 Povidone Iodine - -    114 Sodium Benzoate - -    115  Sodium Disulfite - -    116 Sorbic Acid NA NA    117 Thimerosal - -     Parabens      118 Butyl-P-Hydroxybenzoate - -    119 Ethyl-P-Hydroxybenzoate - -    120 Methyl-P-Hydroxybenzoate - -    121 Propyl-P-Hydroxybenzoate - -      RUBBER CHEMICALS         No Substance 2 days 4 days remarks    Carbamate      122 Zinc Bis ( Diethyldithio carbamate ) (ZDEC) - -    123 Zinc Bis (Dibutyldithiocarbamate) - -    124 1,3-Diphenylguanidine (DPG) - -     Thiourea      125 Dibutylthiourea - -    126 Diphenyltiourea - -    127 Thiourea - -     Mercapto chemicals      128 Morpholinyl Mercaptobenzothiazole - -    129 V-Pdxeolslwm-6-Benzothiazyl-Sulfenamide - -    130 Dibenzothiazyl Disulfide - -     Thiuram chemicals      131 Dipentamethylenethiuram Disulfide - -    132 Tetraethylthiuram Disulfide (Disulfiram) - -    133 Tetramethylthiuram Disulfide - -    134 Tetramethyl Thiuram Monosulfide (TMTM) - -     4-Phenylenediamine derivatives      135 N-Isopropyl-N'-Phenyl-P-Phenylenediamine (IPPD) - -    136 Dicrrqvb-C-Aaalzqlcyebcwlpx (DPPD) - -     Various Rubber Additives      137 Hydroquinone Monobenzylether  - -    138 Hexamethylenetetramine - -    139 4,4'-Dihydroxybiphenyl - -    140 Cyclohexylthiophtalimide - -    141 Ethylenediamine Dihydrochloride - -    142 N-Phenyl-B-Naphthylamine - -    143 Dodecyl Mercaptan - -      HAIRDRESSER Series         No Substance 2 days 4 days remarks   144 P-Phenylenediamin  -  -    145 P-Toluenediamine Sulphate  -  -    146 Ammonium Thioglycolate - -    147 Ammonium Persulfate - -    148 Resorcinol - -    149 3-Aminophenol - -    150 P-Aminophenol - -    151 Glyceryl Monothioglycolate - -    152 Zinc Pyrithione  - -    153 Pyrogallol - -    154 P-Aminodiphenylamine - -    155 Dimethylaminopropylamin (DMAPA) - -      Results of patch tests:                         Interpretation:    - Negative                    A    = Allergic      (+) Erythema    TI   = Toxic/irritant   + E +  Infiltration    RaP = Relevance at Present     ++ E/I + Papulovesicle   Rpr  = Relevance Previously     +++ E/I/P + Blister     nR   = No Relevance    [] No relevant allergic reaction observed    [] Allergic reaction diagnosed against: see later      Interpretation/ remarks:   See later    [] Patient information given   [] ACDS CAMP information's (# ....) to following compounds: .....   [] General information's to following compounds: ......    ==> final Diagnosis:    >>Acute and subacute allergic dermatitis, possibly contact allergic dermatitis    ==> Treatment prescribed/Plan:  See later      These conclusions are made at the best of one's knowledge and belief based on the provided evidence such as patient's history and allergy test results and they can change over time or can be incomplete because of missing information's.    Follow-up for ongoing patch test.      Staff Involved:    Scribe Disclosure  I, aPtty Mireles, am serving as a scribe to document services personally performed by Dr. Castillo Pace, based on data collection and the provider's statements to me.     I spent a total of 10 minutes face to face with Kaley Magana during today s office visit. Over 50% of this time was spent counseling the patient and/or coordinating care.  Please see Assessment and Plan for details.  This excludes any time spent performing patch tests

## 2019-11-26 NOTE — NURSING NOTE
Chief Complaint   Patient presents with     Allergy Testing Followup     Patch testing day #1     Kell Sanchez LPN

## 2019-11-26 NOTE — PROGRESS NOTES
Patient had 148 patch tests placed this visit.    Standard panel (38 tests)    Preservatives & Antimicrobials (27 tests)    Emulsifiers & Additives (24 tests)     Perfumes/Flavours & Plants (25 tests)    Hairdresser panel (12 tests)    Rubber Chemicals (22 tests)

## 2019-11-27 ENCOUNTER — OFFICE VISIT (OUTPATIENT)
Dept: ALLERGY | Facility: CLINIC | Age: 64
End: 2019-11-27
Payer: COMMERCIAL

## 2019-11-27 DIAGNOSIS — Z88.9 ATOPY: ICD-10-CM

## 2019-11-27 DIAGNOSIS — L23.89 ALLERGIC CONTACT DERMATITIS DUE TO OTHER AGENTS: Primary | ICD-10-CM

## 2019-11-27 ASSESSMENT — PAIN SCALES - GENERAL: PAINLEVEL: NO PAIN (0)

## 2019-11-27 NOTE — NURSING NOTE
Allergy Rooming Note    Kaley Magana's goals for this visit include:   Chief Complaint   Patient presents with     Allergy Testing Followup     Kaley is here for allergy testing day 3     Mimi Montenegro LPN

## 2019-11-27 NOTE — PROGRESS NOTES
[facility] Allergy Note    Allergy Clinic  [facility]  [address] [place] [id number]    Allergy Problem List:  1. Acute and subacute allergic dermatitis  -On hands, arms, perioral, abdomen  -Possibly allergic contact dermatitis    Allergy Problem List:    Specialty Problems     None        CC:   Allergy Testing Followup (Kaley is here for allergy testing day 3)      Encounter Date: Nov 27, 2019    History of Present Illness:  Ms. Kaley Magana is a 64 year old female who presents as a referral from [facility]. She has had eczema on her hands, abdomen, arms, and around her mouth for the last 2 years. Was started on lisinopril in 2017 to control her blood pressure and she developed leg edema as well as spots on her legs.   A biopsy was taken which revealed venous stasis dermatitis and the lisinopril was discontinued. Currently uses metoprolol and wears compression stockings. While the venous stasis resolved, she developed eczema on other parts of her body. Treats until eczema is resolved but develops eczema again a few days later. She has been using betamethasone 0.05% ointment 2-4 times per day for 3-4 times per week for over a year. She currently has eczema on her left abdomen, left upper arm, right elbow, hands, and under her left nose.   Has eliminated all fragrances from her hygiene care and clothing care to see if there would be any improvement but did not notice any changes. Has tried wearing cotton gloves with lotion with no improvement. She reports that it seems using lotion causes more irritation than improvement. Has tried using Vaseline alone and with betamethasone with no improvement. Has tried continuing to apply betamethasone to areas of eczema for a few days after resolution without any change in the amount of time for the eczema to reappear. Reports that she has developed blisters with yellow secretions which she took a picture  of on her phone. Denies having asthma or allergies.   Has rhinoconjunctivitis throughout the fall which started a few years ago.   Has noticed hand irritation after wearing rubber gloves. Stress causes her eczema to worsen. Dyes hair. Does not pain nails. Daughter has severe allergies.   Patient brought in a list of current medications, supplements, and lotions as of November 2019: metoprolol 25 mg (2+ years), simvastatin 20mg (4+ years, synthroid 25 mcg (3 months), Maxalt 10mg (5+ years prn), betamethasone 0.05% ointment (2+ years, may just be 1+ years), hydrocortisone 2.5% (1+ years), [employer] eye vitamin, multivitamin, biotin, calcium/vitamin D, Blink eye drops, antacids for acid reflux, CeraVe, Vanicream, and Sarna lotion.    Past Medical History:   There is no problem list on file for this patient.    No past medical history on file.    Allergy History:     Allergies   Allergen Reactions     Amlodipine Swelling     Doxycycline Rash     Sulfamethoxazole-Trimethoprim Rash       Social History:  The patient works at the  of a physical therapy office. Patient has the following hobbies or non-occupational exposure: reads, enjoys being on her porch particularly when it is summer.     reports that she has never smoked. She has never used smokeless tobacco.      Family History:  No family history on file.    Medications:  Current Outpatient Medications   Medication Sig Dispense Refill     betamethasone dipropionate (DIPROSONE) 0.05 % external ointment        Elastic Bandages & Supports (FUTURO RESTORING DRESS SOCKS) MISC Use daily for leg swelling.       hydrocortisone 2.5 % ointment        levothyroxine (SYNTHROID/LEVOTHROID) 25 MCG tablet Take 25 mcg by mouth       metoprolol succinate ER (TOPROL-XL) 25 MG 24 hr tablet Take 25 mg by mouth       rizatriptan (MAXALT) 10 MG tablet        simvastatin (ZOCOR) 20 MG tablet              Review of Systems:  -As per HPI  -Constitutional: The patient denies  fatigue, fevers, chills, unintended weight loss, and night sweats.  -HEENT: Patient denies nonhealing oral sores.  -Skin: As above in HPI. No additional skin concerns.    Physical exam:  Vitals: There were no vitals taken for this visit.  GEN: This is a well developed, well-nourished female in no acute distress, in a pleasant mood.    SKIN: Focused examination of the hands, arms, face, and abdomen was performed.  -Erythematous patches of eczema with lichenification on back of fingers and hands.  -Mild erythematous patches of eczema as well as some nummular eczema throughout arms on extensor surface, underneath left nasal ala, and under flanks on abdomen  -No other lesions of concern on areas examined.     Allergy Tests:    Order for PATCH TESTS    [] Outpatient  [] Inpatient: [facility]..../ Bed ....      Skin Atopy (atopic dermatitis) [] Yes   [x] No  Rhinitis/Sinusitis:   [x] Yes   [] No Fall  Allergic Asthma:   [] Yes   [x] No  Food Allergy:   [] Yes   [x] No  Leg ulcers:   [] Yes   [x] No  Hand eczema:   [x] Yes   [] No   Leading hand:   [x] R   [] L       [] Ambidextrous                        Reason for tests (suspected allergy): acute to subacute dermatitis on back of hands, extensor part of arms and perioral  Known previous allergies: none    Standardized panels  [x] Standard panel (40 tests)  [x] Preservatives & Antimicrobials (31 tests)  [x] Emulsifiers & Additives (25 tests)   [x] Perfumes/Flavours & Plants (25 tests)  [x] Hairdresser panel (12 tests)  [x] Rubber Chemicals (22 tests)  [] Plastics (26 tests)  [] Colorants/Dyes/Food additives (20 tests)  [] Metals (implants/dental) (24 tests)  [] Local anaesthetics/NSAIDs (13 tests)  [] Antibiotics & Antimycotics (14 tests)   [] Corticosteroids (15 tests)   [] Photopatch test (62 tests)   [] others: ...      [] Patient's own products: ...    DO NOT test if chemical or biological identity is unknown!     always ask from patient the product information and safety  sheets (MSDS)   [x] Atopy screen prick test (Atopic predisposition): ...    [] Patient needs consultation with Allergy team (changes of tests may apply)  [] Tests discussed with Allergy team (can have direct appointment for patch tests)    Atopy Screen (Placed 11/25/19 )    No Substance Readings (15 min) Evaluation   POS Histamine 1mg/ml ++    NEG NaCl 0.9% -      No Substance Readings (15 min) Evaluation   1 Alternaria alternata (tenuis)  -    2 Cladosporium herbarum -    3 Aspergillus fumigatus -    4 Penicillium notatum -    5 Dermatophagoides pteronyssinus -    6 Dermatophagoides farinae -    7 Dog epithelium (canis spp) -    8 Cat hair (trinity catus) -    9 Cockroach   (Blatella americana & germanica) -    10 Grass mix midwest   (Nury, Orchard, Redtop, Raul) -    11 Cornel grass (sorghum halepense) -    12 Weed mix   (common Cocklebur, Lamb s quarters, rough redroot Pigweed, Dock/Sorrel) -    13 Mug wort (artemisia vulgare) -    14 Ragweed giant/short (ambrosia spp) -    15 English Plantain (plantago lanceolata) -    16 Tree mix 1 (Pecan, Maple BHR, Oak RVW, american [place], black [place]) -    17 Red cedar (juniperus virginia) -    18 Tree mix 2   (white Mathew, river/red Birch, black Rueter, common [place], american Elm) -    19 Box elder/Maple mix (acer spp) -    20 Kauai shagbark (carya ovata) -       -      Conclusion: Negative atopy screen prick test. No atopic predisposition.    RESULTS & EVALUATION of PATCH TESTS    Patch test readings after     [x] 2 days, [] 3 days [x] 4 days, [] 5 days,    Applied patch tests with results (import here the list of patch tests):  Order documented by: ARACELIS JONES  Order reviewed by: Mimi Montenegro LPN   Physician:   Date/time of application: 11/25/2019  Localization of application: Back >>> no eczema or lesions     STANDARD Series         No Substance 2 days 4 days remarks   1 Jayy Mix [C] - -    2 Colophony - -    3   2-Mercaptobenzothiazole  - -     4 Methylisothiazolinone - -    5 Carba Mix - -    6 Thiuram Mix [A] - -    7 Bisphenol A Epoxy Resin - -    8 I-Ogij-Wyrqqefllpf-Formaldehyde Resin - -    9 Mercapto Mix [A] - -    10 Black Rubber Mix- PPD [B] - -    11 Potassium Dichromate  +  -    12 Balsam of [place] (Myroxylon Pereirae Resin) - -    13 Nickel Sulphate Hexahydrate - -    14 Mixed Dialkyl Thiourea - -    15 Paraben Mix [B] - -    16 Methyldibromo Glutaronitrile + -    17 Fragrance Mix - -    18 2-Bromo-2-Nitropropane-1,3-Diol (Bronopol) - -    19 Lyral - -    20 Tixocortol-21- Pivalate - -    21 Diazolidiyl Urea (Germall II) - -    22 Methyl Methacrylate - -    23 Cobalt (II) Chloride Hexahydrate NA NA    24 Fragrance Mix II  - -    25 Compositae Mix - -    26 Benzoyl Peroxide - -    27 Bacitracin + -    28 Formaldehyde - -    29 Methylchloroisothiazolinone / Methylisothiazolinone - -    30 Corticosteroid Mix - -    31 Sodium Lauryl Sulfate - -    32 Lanolin Alcohol - -    33 Turpentine - -    34 Cetylstearylalcohol - -    35 Chlorhexidine Dicluconate + -    36 Budenoside - -    37 Imidazolidinyl Urea  - -    38 Ethyl-2 Cyanoacrylate NA NA    39 Quaternium 15 (Dowicil 200) - -    40 Decyl Glucoside - -      EMULSIFIERS & ADDITIVES        No Substance 2 days 4 days remarks   41 Polyethylene Glycol-400 - -    42 Cocamidopropyl Betaine - -    43 Amerchol L101 NA NA    44 Propylene Glycol - -    45 Triethanolamine - -    46 Sorbitane Sesquiolate - -    47 Isopropylmyristate - -    48 Polysorbate 80  - -    49 Amidoamine   (Stearamidopropyl Dimethylamine) - -    50 Oleamidopropyl Dimethylamine - -    51 Lauryl Glucoside - -    52 Coconut Diethanolamide  - -    53 2-Hydroxy-4-Methoxy Benzophenone (Oxybenzone) - -    54 Benzophenone-4 (Sulisobenzon) - -    55 Propolis - -    56 Dexpanthenol - -    57 Carboxymethyl Cellulose Sodium - -    58 Abitol - -    59 Tert-Butylhydroquinone - -    60 Benzyl Salicylate - -      Antioxidant      61 Dodecyl Gallate - -    62 Butylhydroxyanisole (BHA) - -    63 Butylhydroxytoluene (BHT) - -    64 Di-Alpha-Tocopherol (Vit E) - -    65 Propyl Gallate - -      PERFUMES, FLAVORS & PLANTS         No Substance 2 days 4 days remarks   66 Benzyl Salicylate - -    67 Benzyl Cinnamate - -    68 Di-Limonene (Dipentene) NA NA    69 Cananga Odorata (Christopher White) (I) - -    70 Lichen Acid Mix - -    71 Mentha Piperita Oil (Peppermint Oil) - -    72 Sesquiterpenelactone mix - -    73 Tea Tree Oil, Oxidized - -    74 Wood Tar Mix - -    75 Abietic Acid NA NA    76 Lavendula Angustifolia Oil (Lavender Oil) - -    77 Camphor  NA NA     Fragrance Mix I      78 Oakmoss Absolute - -    79 Eugenol - -    80 Geraniol - -    81 Hydroxycitronellal - -    82 Isoeugenol - -    83 Cinnamic Aldehyde - -    84 Cinnamic Alcohol  - -    85 Sorbitane Sesquioleate NA NA     Fragrance mix II      86 Citronellol - -    87 Alpha-Hexylcinnamic Aldehyde    - -    88 Citral - -    89 Farnesol - -    90 Coumarin - -      PRESERVATIVES & ANTIMICROBIALS         No Substance 2 days 4 days remarks   91  1,2-Benzisothiazoline-3-One, Sodium Salt - -    92  1,3,5-Gordon (2-Hydroxyethyl) - Hexahydrotriazine (Grotan BK) - -    93 7-Pweolohuomstl-2-Nitro-1, 3-Propanediol - -    94  3, 4, 4' - Triclocarban - -    95 4 - Chloro - 3 - Cresol - -    96 4 - Chloro - 4 - Xylenol (PCMX) - -    97 7-Ethylbicyclooxazolidine (Bioban [id number]) - -    98 Benzalkonium Chloride - -    99 Benzyl Alcohol - -    100 Cetalkonium Chloride - -    101 Cetylpyrimidine Chloride  - -    102 Chloroacetamide - -    103 DMDM Hydantoin - -    104 Glutaraldehyde - -    105 Triclosan NA NA    106 Glyoxal Trimeric Dihydrate - -    107 Iodopropynyl Butylcarbamate - -    108 Octylisothiazoline - -    109 Iodoform - -    110 (Nitrobutyl) Morpholine/(Ethylnitro-Trimethylene) Dimorpholine ([facility] P 1487) - -    111 Phenoxyethanol - -    112 Phenyl Salicylate - -    113 Povidone  Iodine - -    114 Sodium Benzoate - -    115 Sodium Disulfite - -    116 Sorbic Acid NA NA    117 Thimerosal - -     Parabens      118 Butyl-P-Hydroxybenzoate - -    119 Ethyl-P-Hydroxybenzoate - -    120 Methyl-P-Hydroxybenzoate - -    121 Propyl-P-Hydroxybenzoate - -      RUBBER CHEMICALS         No Substance 2 days 4 days remarks    Carbamate      122 Zinc Bis ( Diethyldithio carbamate ) (ZDEC) - -    123 Zinc Bis (Dibutyldithiocarbamate) - -    124 1,3-Diphenylguanidine (DPG) - -     Thiourea      125 Dibutylthiourea - -    126 Diphenyltiourea - -    127 Thiourea - -     Mercapto chemicals      128 Morpholinyl Mercaptobenzothiazole - -    129 R-Iqeswcugvp-5-Benzothiazyl-Sulfenamide - -    130 Dibenzothiazyl Disulfide - -     Thiuram chemicals      131 Dipentamethylenethiuram Disulfide - -    132 Tetraethylthiuram Disulfide (Disulfiram) - -    133 Tetramethylthiuram Disulfide - -    134 Tetramethyl Thiuram Monosulfide (TMTM) - -     4-Phenylenediamine derivatives      135 N-Isopropyl-N'-Phenyl-P-Phenylenediamine (IPPD) - -    136 Vtlrgtio-D-Nbwvuducwlmqdcas (DPPD) - -     Various Rubber Additives      137 Hydroquinone Monobenzylether  - -    138 Hexamethylenetetramine - -    139 4,4'-Dihydroxybiphenyl - -    140 Cyclohexylthiophtalimide - -    141 Ethylenediamine Dihydrochloride - -    142 N-Phenyl-B-Naphthylamine - -    143 Dodecyl Mercaptan - -      HAIRDRESSER Series         No Substance 2 days 4 days remarks   144 P-Phenylenediamin  -  -    145 P-Toluenediamine Sulphate  -  -    146 Ammonium Thioglycolate - -    147 Ammonium Persulfate - -    148 Resorcinol - -    149 3-Aminophenol - -    150 P-Aminophenol - -    151 Glyceryl Monothioglycolate - -    152 Zinc Pyrithione  - -    153 Pyrogallol - -    154 P-Aminodiphenylamine - -    155 Dimethylaminopropylamin (DMAPA) - -      Results of patch tests:                         Interpretation:    - Negative                    A    = Allergic      (+) Erythema    TI    = Toxic/irritant   + E + Infiltration    RaP = Relevance at Present     ++ E/I + Papulovesicle   Rpr  = Relevance Previously     +++ E/I/P + Blister     nR   = No Relevance    [x] No relevant allergic reaction observed    [] Allergic reaction diagnosed against: see later      Interpretation/ remarks:   See later    [] Patient information given   [] ACDS CAMP information's (# ....) to following compounds: .....   [] General information's to following compounds: ......    ==> final Diagnosis:    >>Acute and subacute allergic dermatitis, possibly contact allergic dermatitis    ==> Treatment prescribed/Plan:  See later      These conclusions are made at the best of one's knowledge and belief based on the provided evidence such as patient's history and allergy test results and they can change over time or can be incomplete because of missing information's.    Follow-up for ongoing patch test.      Scribe Disclosure  I, Patty Duran, am serving as a scribe to document services personally performed by Dr. Castillo Pace MD, based on data collection and the provider's statements to me.    I spent a total of 8 minutes face to face with Kaley Magana during today s office visit. Over 50% of this time was spent counseling the patient and/or coordinating care. Please see Assessment and Plan for details.

## 2019-11-29 ENCOUNTER — OFFICE VISIT (OUTPATIENT)
Dept: ALLERGY | Facility: CLINIC | Age: 64
End: 2019-11-29
Payer: COMMERCIAL

## 2019-11-29 ENCOUNTER — APPOINTMENT (OUTPATIENT)
Dept: LAB | Facility: CLINIC | Age: 64
End: 2019-11-29
Payer: COMMERCIAL

## 2019-11-29 DIAGNOSIS — L20.89 OTHER ATOPIC DERMATITIS: Primary | ICD-10-CM

## 2019-11-29 DIAGNOSIS — L23.89 ALLERGIC CONTACT DERMATITIS DUE TO OTHER AGENTS: ICD-10-CM

## 2019-11-29 RX ORDER — TACROLIMUS 1 MG/G
OINTMENT TOPICAL 2 TIMES DAILY
Qty: 60 G | Refills: 3 | Status: SHIPPED | OUTPATIENT
Start: 2019-11-29 | End: 2019-12-02

## 2019-11-29 ASSESSMENT — PAIN SCALES - GENERAL: PAINLEVEL: NO PAIN (0)

## 2019-11-29 NOTE — PROGRESS NOTES
[facility] Allergy Note    Allergy Clinic  Formerly [facility]  [facility]  [address] [place] [id number]    Allergy Problem List:  1. Acute and subacute allergic dermatitis  -On hands, arms, perioral, abdomen  -Possibly allergic contact dermatitis    Allergy Problem List:    Specialty Problems     None        CC:   Allergy Testing Followup (Kaley is here for Patch Testing Day 5.)      Encounter Date: Nov 29, 2019    History of Present Illness:  Ms. Kaley Magana is a 64 year old female who presents as a referral from [facility]. She has had eczema on her hands, abdomen, arms, and around her mouth for the last 2 years. Was started on lisinopril in 2017 to control her blood pressure and she developed leg edema as well as spots on her legs.   A biopsy was taken which revealed venous stasis dermatitis and the lisinopril was discontinued. Currently uses metoprolol and wears compression stockings. While the venous stasis resolved, she developed eczema on other parts of her body. Treats until eczema is resolved but develops eczema again a few days later. She has been using betamethasone 0.05% ointment 2-4 times per day for 3-4 times per week for over a year. She currently has eczema on her left abdomen, left upper arm, right elbow, hands, and under her left nose.   Has eliminated all fragrances from her hygiene care and clothing care to see if there would be any improvement but did not notice any changes. Has tried wearing cotton gloves with lotion with no improvement. She reports that it seems using lotion causes more irritation than improvement. Has tried using Vaseline alone and with betamethasone with no improvement. Has tried continuing to apply betamethasone to areas of eczema for a few days after resolution without any change in the amount of time for the eczema to reappear. Reports that she has developed blisters with yellow secretions which she took a picture  of on her phone. Denies having asthma or allergies.   Has rhinoconjunctivitis throughout the fall which started a few years ago.   Has noticed hand irritation after wearing rubber gloves. Stress causes her eczema to worsen. Dyes hair. Does not pain nails. Daughter has severe allergies.   Patient brought in a list of current medications, supplements, and lotions as of November 2019: metoprolol 25 mg (2+ years), simvastatin 20mg (4+ years, synthroid 25 mcg (3 months), Maxalt 10mg (5+ years prn), betamethasone 0.05% ointment (2+ years, may just be 1+ years), hydrocortisone 2.5% (1+ years), [employer] eye vitamin, multivitamin, biotin, calcium/vitamin D, Blink eye drops, antacids for acid reflux, CeraVe, Vanicream, and Sarna lotion.    Hx since 11/27/19:  The patient comes in today for patch testing day 5. Reports that last night she developed a lesion on the L thumb that she would like examined today. The patient is otherwise feeling well overall. No other allergy concerns at this time.       Past Medical History:   There is no problem list on file for this patient.    No past medical history on file.    Allergy History:     Allergies   Allergen Reactions     Amlodipine Swelling     Doxycycline Rash     Sulfamethoxazole-Trimethoprim Rash       Social History:  The patient works at the  of a physical therapy office. Patient has the following hobbies or non-occupational exposure: reads, enjoys being on her porch particularly when it is summer.     reports that she has never smoked. She has never used smokeless tobacco.      Family History:  No family history on file.    Medications:  Current Outpatient Medications   Medication Sig Dispense Refill     betamethasone dipropionate (DIPROSONE) 0.05 % external ointment        Elastic Bandages & Supports (FUTURO RESTORING DRESS SOCKS) MISC Use daily for leg swelling.       hydrocortisone 2.5 % ointment        levothyroxine (SYNTHROID/LEVOTHROID) 25 MCG tablet Take 25  mcg by mouth       metoprolol succinate ER (TOPROL-XL) 25 MG 24 hr tablet Take 25 mg by mouth       rizatriptan (MAXALT) 10 MG tablet        simvastatin (ZOCOR) 20 MG tablet          Review of Systems:  -As per HPI  -Constitutional: The patient denies fatigue, fevers, chills, unintended weight loss, and night sweats.  -HEENT: Patient denies nonhealing oral sores.  -Skin: As above in HPI. No additional skin concerns.    Physical exam:  Vitals: There were no vitals taken for this visit.  GEN: This is a well developed, well-nourished female in no acute distress, in a pleasant mood.    SKIN: Focused examination of the hands, arms, face, and abdomen was performed.  -Erythematous patches of eczema with lichenification on back of fingers and hands.  -Mild erythematous patches of eczema as well as some nummular eczema throughout arms on extensor surface, underneath left nasal ala, and under flanks on abdomen  -No other lesions of concern on areas examined.     Allergy Tests:    Order for PATCH TESTS    [] Outpatient  [] Inpatient: [facility]..../ Bed ....      Skin Atopy (atopic dermatitis) [] Yes   [x] No  Rhinitis/Sinusitis:   [x] Yes   [] No Fall  Allergic Asthma:   [] Yes   [x] No  Food Allergy:   [] Yes   [x] No  Leg ulcers:   [] Yes   [x] No  Hand eczema:   [x] Yes   [] No   Leading hand:   [x] R   [] L       [] Ambidextrous                        Reason for tests (suspected allergy): acute to subacute dermatitis on back of hands, extensor part of arms and perioral  Known previous allergies: none    Standardized panels  [x] Standard panel (40 tests)  [x] Preservatives & Antimicrobials (31 tests)  [x] Emulsifiers & Additives (25 tests)   [x] Perfumes/Flavours & Plants (25 tests)  [x] Hairdresser panel (12 tests)  [x] Rubber Chemicals (22 tests)  [] Plastics (26 tests)  [] Colorants/Dyes/Food additives (20 tests)  [] Metals (implants/dental) (24 tests)  [] Local anaesthetics/NSAIDs (13 tests)  [] Antibiotics & Antimycotics  (14 tests)   [] Corticosteroids (15 tests)   [] Photopatch test (62 tests)   [] others: ...      [] Patient's own products: ...    DO NOT test if chemical or biological identity is unknown!     always ask from patient the product information and safety sheets (MSDS)   [x] Atopy screen prick test (Atopic predisposition): ...    [] Patient needs consultation with Allergy team (changes of tests may apply)  [] Tests discussed with Allergy team (can have direct appointment for patch tests)    Atopy Screen (Placed 11/25/19 )    No Substance Readings (15 min) Evaluation   POS Histamine 1mg/ml ++    NEG NaCl 0.9% -      No Substance Readings (15 min) Evaluation   1 Alternaria alternata (tenuis)  -    2 Cladosporium herbarum -    3 Aspergillus fumigatus -    4 Penicillium notatum -    5 Dermatophagoides pteronyssinus -    6 Dermatophagoides farinae -    7 Dog epithelium (canis spp) -    8 Cat hair (trinity catus) -    9 Cockroach   (Blatella americana & germanica) -    10 Grass mix [facility]   (Nury, Orchard, Redtop, Raul) -    11 Cornel grass (sorghum halepense) -    12 Weed mix   (common Cocklebur, Lamb s quarters, rough redroot Pigweed, Dock/Sorrel) -    13 Mug wort (artemisia vulgare) -    14 Ragweed giant/short (ambrosia spp) -    15 English Plantain (plantago lanceolata) -    16 Tree mix 1 (Pecan, Maple BHR, Oak RVW, american [place], black [place]) -    17 Red cedar (juniperus virginia) -    18 Tree mix 2   (white Mathew, river/red Birch, black [place], common [place], american Elm) -    19 Box elder/Maple mix (acer spp) -    20 Chester shagbark (carya ovata) -       -      Conclusion: Negative atopy screen prick test. No atopic predisposition.    RESULTS & EVALUATION of PATCH TESTS    Patch test readings after     [x] 2 days, [] 3 days [x] 4 days, [] 5 days,    Applied patch tests with results (import here the list of patch tests):  Order documented by: ARACELIS JONES  Order reviewed by: Mimi Montenegro LPN    Physician:   Date/time of application: 11/25/2019  Localization of application: Back >>> no eczema or lesions     STANDARD Series         No Substance 2 days 4 days remarks   1 Jayy Mix [C] - -    2 Colophony - -    3  2-Mercaptobenzothiazole  - -     4 Methylisothiazolinone - -    5 Carba Mix - -    6 Thiuram Mix [A] - -    7 Bisphenol A Epoxy Resin - -    8 D-Sxwc-Oxlavzgsrtz-Formaldehyde Resin - -    9 Mercapto Mix [A] - -    10 Black Rubber Mix- PPD [B] - -    11 Potassium Dichromate  +  -    12 Balsam of [place] (Myroxylon Pereirae Resin) - -    13 Nickel Sulphate Hexahydrate - +/++    14 Mixed Dialkyl Thiourea - -    15 Paraben Mix [B] - -    16 Methyldibromo Glutaronitrile + -    17 Fragrance Mix - -    18 2-Bromo-2-Nitropropane-1,3-Diol (Bronopol) - -    19 Lyral - -    20 Tixocortol-21- Pivalate - -    21 Diazolidiyl Urea (Germall II) - -    22 Methyl Methacrylate - -    23 Cobalt (II) Chloride Hexahydrate NA NA    24 Fragrance Mix II  - -    25 Compositae Mix - -    26 Benzoyl Peroxide - -    27 Bacitracin + +    28 Formaldehyde - -    29 Methylchloroisothiazolinone / Methylisothiazolinone - -    30 Corticosteroid Mix - -    31 Sodium Lauryl Sulfate - -    32 Lanolin Alcohol - -    33 Turpentine - -    34 Cetylstearylalcohol - -    35 Chlorhexidine Dicluconate + -    36 Budenoside - -    37 Imidazolidinyl Urea  - -    38 Ethyl-2 Cyanoacrylate NA NA    39 Quaternium 15 (Dowicil 200) - -    40 Decyl Glucoside - -      EMULSIFIERS & ADDITIVES        No Substance 2 days 4 days remarks   41 Polyethylene Glycol-400 - -    42 Cocamidopropyl Betaine - -    43 Amerchol L101 NA NA    44 Propylene Glycol - -    45 Triethanolamine - -    46 Sorbitane Sesquiolate - -    47 Isopropylmyristate - -    48 Polysorbate 80  - -    49 Amidoamine   (Stearamidopropyl Dimethylamine) - -    50 Oleamidopropyl Dimethylamine - -    51 Lauryl Glucoside - -    52 Coconut Diethanolamide  - -    53 2-Hydroxy-4-Methoxy  Benzophenone (Oxybenzone) - -    54 Benzophenone-4 (Sulisobenzon) - -    55 Propolis - -    56 Dexpanthenol - -    57 Carboxymethyl Cellulose Sodium - -    58 Abitol - -    59 Tert-Butylhydroquinone - -    60 Benzyl Salicylate - -     Antioxidant      61 Dodecyl Gallate - -    62 Butylhydroxyanisole (BHA) - -    63 Butylhydroxytoluene (BHT) - -    64 Di-Alpha-Tocopherol (Vit E) - -    65 Propyl Gallate - -      PERFUMES, FLAVORS & PLANTS         No Substance 2 days 4 days remarks   66 Benzyl Salicylate - -    67 Benzyl Cinnamate - -    68 Di-Limonene (Dipentene) NA NA    69 Cananga Odorata (Christopher White) (I) - -    70 Lichen Acid Mix - -    71 Mentha Piperita Oil (Peppermint Oil) - -    72 Sesquiterpenelactone mix - -    73 Tea Tree Oil, Oxidized - -    74 Wood Tar Mix - -    75 Abietic Acid NA NA    76 Lavendula Angustifolia Oil (Lavender Oil) - -    77 Camphor  NA NA     Fragrance Mix I      78 [place] Absolute - -    79 Eugenol - -    80 Geraniol - -    81 Hydroxycitronellal - -    82 Isoeugenol - -    83 Cinnamic Aldehyde - -    84 Cinnamic Alcohol  - -    85 Sorbitane Sesquioleate NA NA     Fragrance mix II      86 Citronellol - -    87 Alpha-Hexylcinnamic Aldehyde    - -    88 Citral - -    89 Farnesol - -    90 Coumarin - -      PRESERVATIVES & ANTIMICROBIALS         No Substance 2 days 4 days remarks   91  1,2-Benzisothiazoline-3-One, Sodium Salt - -    92  1,3,5-Gordon (2-Hydroxyethyl) - Hexahydrotriazine (Grotan BK) - -    93 7-Esqxjismpvcnq-8-Nitro-1, 3-Propanediol - -    94  3, 4, 4' - Triclocarban - -    95 4 - Chloro - 3 - Cresol - -    96 4 - Chloro - 4 - Xylenol (PCMX) - -    97 7-Ethylbicyclooxazolidine (Bioban [id number]) - -    98 Benzalkonium Chloride - -    99 Benzyl Alcohol - -    100 Cetalkonium Chloride - -    101 Cetylpyrimidine Chloride  - -    102 Chloroacetamide - -    103 DMDM Hydantoin - -    104 Glutaraldehyde - -    105 Triclosan NA NA    106 Glyoxal Trimeric Dihydrate - -    107  Iodopropynyl Butylcarbamate - -    108 Octylisothiazoline - -    109 Iodoform - -    110 (Nitrobutyl) Morpholine/(Ethylnitro-Trimethylene) Dimorpholine (Bioban P 1487) - -    111 Phenoxyethanol - -    112 Phenyl Salicylate - -    113 Povidone Iodine - -    114 Sodium Benzoate - -    115 Sodium Disulfite - -    116 Sorbic Acid NA NA    117 Thimerosal - -     Parabens      118 Butyl-P-Hydroxybenzoate - -    119 Ethyl-P-Hydroxybenzoate - -    120 Methyl-P-Hydroxybenzoate - -    121 Propyl-P-Hydroxybenzoate - -      RUBBER CHEMICALS         No Substance 2 days 4 days remarks    Carbamate      122 Zinc Bis ( Diethyldithio carbamate ) (ZDEC) - -    123 Zinc Bis (Dibutyldithiocarbamate) - -    124 1,3-Diphenylguanidine (DPG) - -     Thiourea      125 Dibutylthiourea - -    126 Diphenyltiourea - -    127 Thiourea - -     Mercapto chemicals      128 Morpholinyl Mercaptobenzothiazole - -    129 W-Nmqbwstngi-0-Benzothiazyl-Sulfenamide - -    130 Dibenzothiazyl Disulfide - -     Thiuram chemicals      131 Dipentamethylenethiuram Disulfide - -    132 Tetraethylthiuram Disulfide (Disulfiram) - -    133 Tetramethylthiuram Disulfide - -    134 Tetramethyl Thiuram Monosulfide (TMTM) - -     4-Phenylenediamine derivatives      135 N-Isopropyl-N'-Phenyl-P-Phenylenediamine (IPPD) - -    136 Pmmzdpko-M-Peaalxfexozpbckm (DPPD) - -     Various Rubber Additives      137 Hydroquinone Monobenzylether  - -    138 Hexamethylenetetramine - -    139 4,4'-Dihydroxybiphenyl - -    140 Cyclohexylthiophtalimide - -    141 Ethylenediamine Dihydrochloride - -    142 N-Phenyl-B-Naphthylamine - -    143 Dodecyl Mercaptan - -      HAIRDRESSER Series         No Substance 2 days 4 days remarks   144 P-Phenylenediamin  -  -    145 P-Toluenediamine Sulphate  -  -    146 Ammonium Thioglycolate - -    147 Ammonium Persulfate - -    148 Resorcinol - -    149 3-Aminophenol - -    150 P-Aminophenol - -    151 Glyceryl Monothioglycolate - -    152 Zinc Pyrithione   - -    153 Pyrogallol - -    154 P-Aminodiphenylamine - -    155 Dimethylaminopropylamin (DMAPA) - -      Results of patch tests:                         Interpretation:    - Negative                    A    = Allergic      (+) Erythema    TI   = Toxic/irritant   + E + Infiltration    RaP = Relevance at Present     ++ E/I + Papulovesicle   Rpr  = Relevance Previously     +++ E/I/P + Blister     nR   = No Relevance    [x] No relevant allergic reaction observed:  Standard series:  Nickel Sulphate Hexahydrate +/++  Bacitracin +       Intraderrmal Testing (Placed November 29, 2019)    No Substance Conc.  Readings (15min) Evaluation   1 NaCl  0.9% -    2 Histamine  0.1mg / ml ++ 13mmP/20mmE   4 Standard Dust Mite - D. Farinae 1:10 +/++ 7mmP/10mmE   5 Standard Dust Mite - D. Pteronyssinus 1:10 + 8mmP/8mmE   10 Aspergillus fumigatus  1:10 - 3mmP/3mmE   11 Penicillium notatum 1:10 -      Conclusion:   Patient has a slight reaction to the house dust mites which would show that she has an atopic predisposition and this could explain as well that the hand eczema is not due to a contact allergy but as a minimal of atopic dermatitis.      Interpretation/ remarks:   Patient seems to have a contact sensitization to the nickel and less to bacitracin. The nickel allergy would fit well into the atopic dermatitis but it probably doesn't have relevance for the dermatitis on the hands and in the face, as well for the bacitracin the patient doesn't really use this.    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     No signs for relevant allergic contact dermatitis    In intradermal test, some indications for immediate type reaction to house dust mites (patient will feedback if any delayed reaction to house dust mites)    ==> Treatment prescribed/Plan:    Ordered  labs today: Total IgE and specific IgE for D. Farinae and D. pteronyssinus    Prescribed Protopic 0.1% ointment to be applied topically twice daily on arms, hands and face where symptoms occur.    Continue using CeraVe for hand and body wash. The patient is already using this and I agree that she continue with this. Also prescribed Free and Clear shampoo and conditioner. Patient has to make sure that the hair dye doesn't contain Nickel.    Patient to report if there is any delayed-reaction from intradermal testing done on 11/29/19. If any reaction, we will have to see her on Monday.     Refer patient back to her own dermatologist after we have finalized or obtained the final information on delayed type reaction to dust mite or molds and on the specific IgE and total IgE for dust mite to prove the patient really is atopic. If the she has positive allergy to dust mites, we would propose that the patient reduce house dust mites in the bedroom and bed (information given) and we would propose to treat the dermatitis on hands and on face as an atopic dermatitis with protopic and/or UV treatment if necessary or other types of treatment as proposed by the referring dermatologist.    These conclusions are made at the best of one's knowledge and belief based on the provided evidence such as patient's history and allergy test results and they can change over time or can be incomplete because of missing information's.    Follow-up pending labs and delayed-type reactions to intradermal testing. Otherwise, followup with referring dermatologist.       Staff Involved:    Scribe Disclosure  I, Patty Mireles, am serving as a scribe to document services personally performed by Dr. Castillo Pace, based on data collection and the provider's statements to me.     Start Time: 12:52 PM  End Time: 1:35 PM    I spent a total of 26 minutes face to face with Kaley Magana during today s office visit. Over 50% of this time was spent counseling  the patient and/or coordinating care.  Please see Assessment and Plan for details.  This excludes any time spent performing Intradermal tests

## 2019-11-29 NOTE — NURSING NOTE
Chief Complaint   Patient presents with     Allergy Testing Followup     patch testing day #5     Kell Sanchez LPN

## 2019-11-29 NOTE — PROGRESS NOTES
Patient had 6 intradermal tests placed this visit.    Control Tests (2 tests)    Standard Adult Panel (4 tests)        Intraderrmal Testing (Placed 11/29/19)    No Substance Conc.  Readings (15min) Evaluation   1 NaCl  0.9% -    2 Histamine  0.1mg / ml -    4 Standard Dust Mite - D. Farinae 1:10 -    5 Standard Dust Mite - D. Pteronyssinus 1:10 -    10 Aspergillus fumigatus  1:10 -    11 Penicillium 1:10 -      Conclusion:

## 2019-12-01 LAB
D FARINAE IGE QN: <0.1 KU(A)/L
D PTERONYSS IGE QN: <0.1 KU(A)/L
IGE SERPL-ACNC: 26 KIU/L (ref 0–114)

## 2019-12-02 ENCOUNTER — OFFICE VISIT (OUTPATIENT)
Dept: ALLERGY | Facility: CLINIC | Age: 64
End: 2019-12-02
Payer: COMMERCIAL

## 2019-12-02 DIAGNOSIS — L23.89 ALLERGIC CONTACT DERMATITIS DUE TO OTHER AGENTS: ICD-10-CM

## 2019-12-02 DIAGNOSIS — L20.89 OTHER ATOPIC DERMATITIS: ICD-10-CM

## 2019-12-02 RX ORDER — TACROLIMUS 1 MG/G
OINTMENT TOPICAL 2 TIMES DAILY
Qty: 60 G | Refills: 3 | Status: SHIPPED | OUTPATIENT
Start: 2019-12-02 | End: 2019-12-02

## 2019-12-02 RX ORDER — TACROLIMUS 1 MG/G
OINTMENT TOPICAL 2 TIMES DAILY
Qty: 60 G | Refills: 3 | Status: SHIPPED | OUTPATIENT
Start: 2019-12-02

## 2019-12-02 ASSESSMENT — PAIN SCALES - GENERAL: PAINLEVEL: NO PAIN (0)

## 2019-12-02 NOTE — NURSING NOTE
Allergy Rooming Note    Kaley Magana's goals for this visit include:   Chief Complaint   Patient presents with     Allergies     Kaley is here for a delayed reaction to DM.      Mimi Montenegro LPN

## 2019-12-02 NOTE — PROGRESS NOTES
[facility] Allergy Note    Allergy Clinic  [facility]  [address] [place] [id number]    Allergy Problem List:  1. Acute and subacute allergic dermatitis  -On hands, arms, perioral, abdomen  -Possibly allergic contact dermatitis    Allergy Problem List:    Specialty Problems     None        CC:   Allergies (Kaley is here for a delayed reaction to DM. )      Encounter Date: Dec 2, 2019    History of Present Illness:  Ms. Kaley Magana is a 64 year old female who presents as a referral from a dermatologist Tamika. She has had eczema on her hands, abdomen, arms, and around her mouth for the last 2 years. Was started on lisinopril in 2017 to control her blood pressure and she developed leg edema as well as spots on her legs.   A biopsy was taken which revealed venous stasis dermatitis and the lisinopril was discontinued. Currently uses metoprolol and wears compression stockings. While the venous stasis resolved, she developed eczema on other parts of her body. Treats until eczema is resolved but develops eczema again a few days later. She has been using betamethasone 0.05% ointment 2-4 times per day for 3-4 times per week for over a year. She currently has eczema on her left abdomen, left upper arm, right elbow, hands, and under her left nose.   Has eliminated all fragrances from her hygiene care and clothing care to see if there would be any improvement but did not notice any changes. Has tried wearing cotton gloves with lotion with no improvement. She reports that it seems using lotion causes more irritation than improvement. Has tried using Vaseline alone and with betamethasone with no improvement. Has tried continuing to apply betamethasone to areas of eczema for a few days after resolution without any change in the amount of time for the eczema to reappear. Reports that she has developed blisters with yellow secretions which she took a  picture of on her phone. Denies having asthma or allergies.   Has rhinoconjunctivitis throughout the fall which started a few years ago.   Has noticed hand irritation after wearing rubber gloves. Stress causes her eczema to worsen. Dyes hair. Does not pain nails. Daughter has severe allergies.   Patient brought in a list of current medications, supplements, and lotions as of November 2019: metoprolol 25 mg (2+ years), simvastatin 20mg (4+ years, synthroid 25 mcg (3 months), Maxalt 10mg (5+ years prn), betamethasone 0.05% ointment (2+ years, may just be 1+ years), hydrocortisone 2.5% (1+ years), [employer] eye vitamin, multivitamin, biotin, calcium/vitamin D, Blink eye drops, antacids for acid reflux, CeraVe, Vanicream, and Sarna lotion.    Hx since 11/27/19:  The patient comes in today for patch testing day 5. Reports that last night she developed a lesion on the L thumb that she would like examined today. The patient is otherwise feeling well overall. No other allergy concerns at this time.     Hx since 11/29/19:  The patient comes in today for follow up of delayed-reaction to her intradermal tests. Of note, there was significant infiltration and increased erythema over the test field sites of D. Farinae and D. Pteronyssinus on her R upper extremity.  She shares that this occurred about 3 days after injection.     Past Medical History:   There is no problem list on file for this patient.    No past medical history on file.    Allergy History:     Allergies   Allergen Reactions     Amlodipine Swelling     Doxycycline Rash     Sulfamethoxazole-Trimethoprim Rash       Social History:  The patient works at the  of a physical therapy office. Patient has the following hobbies or non-occupational exposure: reads, enjoys being on her porch particularly when it is summer.     reports that she has never smoked. She has never used smokeless tobacco.      Family History:  No family history on  file.    Medications:  Current Outpatient Medications   Medication Sig Dispense Refill     betamethasone dipropionate (DIPROSONE) 0.05 % external ointment        Elastic Bandages & Supports (FUTURO RESTORING DRESS SOCKS) MISC Use daily for leg swelling.       hydrocortisone 2.5 % ointment        levothyroxine (SYNTHROID/LEVOTHROID) 25 MCG tablet Take 25 mcg by mouth       metoprolol succinate ER (TOPROL-XL) 25 MG 24 hr tablet Take 25 mg by mouth       rizatriptan (MAXALT) 10 MG tablet        simvastatin (ZOCOR) 20 MG tablet        tacrolimus (PROTOPIC) 0.1 % external ointment Apply topically 2 times daily On hands and arms and face    Use for hair wash Free&Clear Shampoo and Conditioner 60 g 3       Review of Systems:  -As per HPI  -Constitutional: The patient denies fatigue, fevers, chills, unintended weight loss, and night sweats.  -HEENT: Patient denies nonhealing oral sores.  -Skin: As above in HPI. No additional skin concerns.    Physical exam:  Vitals: There were no vitals taken for this visit.  GEN: This is a well developed, well-nourished female in no acute distress, in a pleasant mood.    SKIN: Focused examination of the hands, arms, face, and abdomen was performed.  -Erythematous patches of eczema with lichenification on back of fingers and hands.  -Mild erythematous patches of eczema as well as some nummular eczema throughout arms on extensor surface, underneath left nasal ala, and under flanks on abdomen  -No other lesions of concern on areas examined.     Allergy Tests:    Order for PATCH TESTS    [] Outpatient  [] Inpatient: [facility]..../ Bed ....      Skin Atopy (atopic dermatitis) [] Yes   [x] No  Rhinitis/Sinusitis:   [x] Yes   [] No Fall  Allergic Asthma:   [] Yes   [x] No  Food Allergy:   [] Yes   [x] No  Leg ulcers:   [] Yes   [x] No  Hand eczema:   [x] Yes   [] No   Leading hand:   [x] R   [] L       [] Ambidextrous                        Reason for tests (suspected allergy): acute to subacute  dermatitis on back of hands, extensor part of arms and perioral  Known previous allergies: none    Standardized panels  [x] Standard panel (40 tests)  [x] Preservatives & Antimicrobials (31 tests)  [x] Emulsifiers & Additives (25 tests)   [x] Perfumes/Flavours & Plants (25 tests)  [x] Hairdresser panel (12 tests)  [x] Rubber Chemicals (22 tests)  [] Plastics (26 tests)  [] Colorants/Dyes/Food additives (20 tests)  [] Metals (implants/dental) (24 tests)  [] Local anaesthetics/NSAIDs (13 tests)  [] Antibiotics & Antimycotics (14 tests)   [] Corticosteroids (15 tests)   [] Photopatch test (62 tests)   [] others: ...      [] Patient's own products: ...    DO NOT test if chemical or biological identity is unknown!     always ask from patient the product information and safety sheets (MSDS)   [x] Atopy screen prick test (Atopic predisposition): ...    [] Patient needs consultation with Allergy team (changes of tests may apply)  [] Tests discussed with Allergy team (can have direct appointment for patch tests)    Atopy Screen (Placed 11/25/19 )    No Substance Readings (15 min) Evaluation   POS Histamine 1mg/ml ++    NEG NaCl 0.9% -      No Substance Readings (15 min) Evaluation   1 Alternaria alternata (tenuis)  -    2 Cladosporium herbarum -    3 Aspergillus fumigatus -    4 Penicillium notatum -    5 Dermatophagoides pteronyssinus -    6 Dermatophagoides farinae -    7 Dog epithelium (canis spp) -    8 Cat hair (trinity catus) -    9 Cockroach   (Blatella americana & germanica) -    10 Grass mix midwest   (Nury, Orchard, Redtop, Raul) -    11 Cornel grass (sorghum halepense) -    12 Weed mix   (common Cocklebur, Lamb s quarters, rough redroot Pigweed, Dock/Sorrel) -    13 Mug wort (artemisia vulgare) -    14 Ragweed giant/short (ambrosia spp) -    15 English Plantain (plantago lanceolata) -    16 Tree mix 1 (Pecan, Maple BHR, Oak RVW, american Yancey, black [place]) -    17 Red cedar (juniperus virginia) -    18 Tree  mix 2   (white Mathew, river/red Birch, black [place], common [place], american Elm) -    19 Box elder/Maple mix (acer spp) -    20 Kershaw shagbark (carya ovata) -       -      Conclusion: Negative atopy screen prick test. No atopic predisposition.    RESULTS & EVALUATION of PATCH TESTS    Patch test readings after     [x] 2 days, [] 3 days [x] 4 days, [] 5 days,    Applied patch tests with results (import here the list of patch tests):  Order documented by: ARACELIS JONES  Order reviewed by: Mimi Montenegro LPN   Physician:   Date/time of application: 11/25/2019  Localization of application: Back >>> no eczema or lesions     STANDARD Series         No Substance 2 days 4 days remarks   1 Jayy Mix [C] - -    2 Colophony - -    3  2-Mercaptobenzothiazole  - -     4 Methylisothiazolinone - -    5 Carba Mix - -    6 Thiuram Mix [A] - -    7 Bisphenol A Epoxy Resin - -    8 Z-Rmys-Yffnnshrwrr-Formaldehyde Resin - -    9 Mercapto Mix [A] - -    10 Black Rubber Mix- PPD [B] - -    11 Potassium Dichromate  +  -    12 Balsam of [place] (Myroxylon Pereirae Resin) - -    13 Nickel Sulphate Hexahydrate - +/++    14 Mixed Dialkyl Thiourea - -    15 Paraben Mix [B] - -    16 Methyldibromo Glutaronitrile + -    17 Fragrance Mix - -    18 2-Bromo-2-Nitropropane-1,3-Diol (Bronopol) - -    19 Lyral - -    20 Tixocortol-21- Pivalate - -    21 Diazolidiyl Urea (Germall II) - -    22 Methyl Methacrylate - -    23 Cobalt (II) Chloride Hexahydrate NA NA    24 Fragrance Mix II  - -    25 Compositae Mix - -    26 Benzoyl Peroxide - -    27 Bacitracin + +    28 Formaldehyde - -    29 Methylchloroisothiazolinone / Methylisothiazolinone - -    30 Corticosteroid Mix - -    31 Sodium Lauryl Sulfate - -    32 Lanolin Alcohol - -    33 Turpentine - -    34 Cetylstearylalcohol - -    35 Chlorhexidine Dicluconate + -    36 Budenoside - -    37 Imidazolidinyl Urea  - -    38 Ethyl-2 Cyanoacrylate NA NA    39 Quaternium 15 (Jenniferl  200) - -    40 Decyl Glucoside - -      EMULSIFIERS & ADDITIVES        No Substance 2 days 4 days remarks   41 Polyethylene Glycol-400 - -    42 Cocamidopropyl Betaine - -    43 Amerchol L101 NA NA    44 Propylene Glycol - -    45 Triethanolamine - -    46 Sorbitane Sesquiolate - -    47 Isopropylmyristate - -    48 Polysorbate 80  - -    49 Amidoamine   (Stearamidopropyl Dimethylamine) - -    50 Oleamidopropyl Dimethylamine - -    51 Lauryl Glucoside - -    52 Coconut Diethanolamide  - -    53 2-Hydroxy-4-Methoxy Benzophenone (Oxybenzone) - -    54 Benzophenone-4 (Sulisobenzon) - -    55 Propolis - -    56 Dexpanthenol - -    57 Carboxymethyl Cellulose Sodium - -    58 Abitol - -    59 Tert-Butylhydroquinone - -    60 Benzyl Salicylate - -     Antioxidant      61 Dodecyl Gallate - -    62 Butylhydroxyanisole (BHA) - -    63 Butylhydroxytoluene (BHT) - -    64 Di-Alpha-Tocopherol (Vit E) - -    65 Propyl Gallate - -      PERFUMES, FLAVORS & PLANTS         No Substance 2 days 4 days remarks   66 Benzyl Salicylate - -    67 Benzyl Cinnamate - -    68 Di-Limonene (Dipentene) NA NA    69 Cananga Odorata (Christopher White) (I) - -    70 Lichen Acid Mix - -    71 Mentha Piperita Oil (Peppermint Oil) - -    72 Sesquiterpenelactone mix - -    73 Tea Tree Oil, Oxidized - -    74 Wood Tar Mix - -    75 Abietic Acid NA NA    76 Lavendula Angustifolia Oil (Lavender Oil) - -    77 Camphor  NA NA     Fragrance Mix I      78 Oakmoss Absolute - -    79 Eugenol - -    80 Geraniol - -    81 Hydroxycitronellal - -    82 Isoeugenol - -    83 Cinnamic Aldehyde - -    84 Cinnamic Alcohol  - -    85 Sorbitane Sesquioleate NA NA     Fragrance mix II      86 Citronellol - -    87 Alpha-Hexylcinnamic Aldehyde    - -    88 Citral - -    89 Farnesol - -    90 Coumarin - -      PRESERVATIVES & ANTIMICROBIALS         No Substance 2 days 4 days remarks   91  1,2-Benzisothiazoline-3-One, Sodium Salt - -    92  1,3,5-Gordon (2-Hydroxyethyl) -  Hexahydrotriazine (Grotan BK) - -    93 9-Orhpetpdpkeyc-5-Nitro-1, 3-Propanediol - -    94  3, 4, 4' - Triclocarban - -    95 4 - Chloro - 3 - Cresol - -    96 4 - Chloro - 4 - Xylenol (PCMX) - -    97 7-Ethylbicyclooxazolidine (Bioban [id number]) - -    98 Benzalkonium Chloride - -    99 Benzyl Alcohol - -    100 Cetalkonium Chloride - -    101 Cetylpyrimidine Chloride  - -    102 Chloroacetamide - -    103 DMDM Hydantoin - -    104 Glutaraldehyde - -    105 Triclosan NA NA    106 Glyoxal Trimeric Dihydrate - -    107 Iodopropynyl Butylcarbamate - -    108 Octylisothiazoline - -    109 Iodoform - -    110 (Nitrobutyl) Morpholine/(Ethylnitro-Trimethylene) Dimorpholine (Bioban P 1487) - -    111 Phenoxyethanol - -    112 Phenyl Salicylate - -    113 Povidone Iodine - -    114 Sodium Benzoate - -    115 Sodium Disulfite - -    116 Sorbic Acid NA NA    117 Thimerosal - -     Parabens      118 Butyl-P-Hydroxybenzoate - -    119 Ethyl-P-Hydroxybenzoate - -    120 Methyl-P-Hydroxybenzoate - -    121 Propyl-P-Hydroxybenzoate - -      RUBBER CHEMICALS         No Substance 2 days 4 days remarks    Carbamate      122 Zinc Bis ( Diethyldithio carbamate ) (ZDEC) - -    123 Zinc Bis (Dibutyldithiocarbamate) - -    124 1,3-Diphenylguanidine (DPG) - -     Thiourea      125 Dibutylthiourea - -    126 Diphenyltiourea - -    127 Thiourea - -     Mercapto chemicals      128 Morpholinyl Mercaptobenzothiazole - -    129 C-Pnpjupknhr-6-Benzothiazyl-Sulfenamide - -    130 Dibenzothiazyl Disulfide - -     Thiuram chemicals      131 Dipentamethylenethiuram Disulfide - -    132 Tetraethylthiuram Disulfide (Disulfiram) - -    133 Tetramethylthiuram Disulfide - -    134 Tetramethyl Thiuram Monosulfide (TMTM) - -     4-Phenylenediamine derivatives      135 N-Isopropyl-N'-Phenyl-P-Phenylenediamine (IPPD) - -    136 Zazpvulq-J-Nvkyyjyoqtivxlhz (DPPD) - -     Various Rubber Additives      137 Hydroquinone Monobenzylether  - -    138  Hexamethylenetetramine - -    139 4,4'-Dihydroxybiphenyl - -    140 Cyclohexylthiophtalimide - -    141 Ethylenediamine Dihydrochloride - -    142 N-Phenyl-B-Naphthylamine - -    143 Dodecyl Mercaptan - -      HAIRDRESSER Series         No Substance 2 days 4 days remarks   144 P-Phenylenediamin  -  -    145 P-Toluenediamine Sulphate  -  -    146 Ammonium Thioglycolate - -    147 Ammonium Persulfate - -    148 Resorcinol - -    149 3-Aminophenol - -    150 P-Aminophenol - -    151 Glyceryl Monothioglycolate - -    152 Zinc Pyrithione  - -    153 Pyrogallol - -    154 P-Aminodiphenylamine - -    155 Dimethylaminopropylamin (DMAPA) - -      Results of patch tests:                         Interpretation:    - Negative                    A    = Allergic      (+) Erythema    TI   = Toxic/irritant   + E + Infiltration    RaP = Relevance at Present     ++ E/I + Papulovesicle   Rpr  = Relevance Previously     +++ E/I/P + Blister     nR   = No Relevance    [x] No relevant allergic reaction observed:  Standard series:  Nickel Sulphate Hexahydrate +/++  Bacitracin +       Intraderrmal Testing (Placed November 29, 2019)    No Substance Conc.  Readings (15min) Evaluation   1 NaCl  0.9% -    2 Histamine  0.1mg / ml ++ 13mmP/20mmE   4 Standard Dust Mite - D. Farinae 1:10 +/++ 7mmP/10mmE   5 Standard Dust Mite - D. Pteronyssinus 1:10 + 8mmP/8mmE   10 Aspergillus fumigatus  1:10 - 3mmP/3mmE   11 Penicillium notatum 1:10 -      Conclusion:   Patient has a slight reaction to the house dust mites which would show that she has an atopic predisposition and this could explain as well that the hand eczema is not due to a contact allergy but as a minimal of atopic dermatitis.  Delayed reactions --> 3 days after, D. Farinae has an infiltration of about 15mm and 20mm erythema and D. Pteronyssinus had infiltration of about 13mm and 15mm erythema    Interpretation/ remarks:   Patient seems to have a contact sensitization to the nickel and less  to bacitracin. The nickel allergy would fit well into the atopic dermatitis but it probably doesn't have relevance for the dermatitis on the hands and in the face, as well for the bacitracin the patient doesn't really use this.    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 Pteronyssinus and D. Farinae negatives!    --> Very strong immediate and delayed type sensitivity to the house dust mite D. Farinae and D. 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 Information was previously given.     Prescribed Protopic 0.1% ointment to be applied topically twice daily on arms, hands and face where symptoms occur. Pt unable to obtain it from her pharmacy in the interim. We will see how we can obtain Protopic for the patient and counseled her to use this.    Continue using CeraVe for hand and body wash. The patient is already using this and I agree that she continue with this. Also prescribed Free and Clear shampoo and conditioner. Patient has to make sure that the hair dye doesn't contain Nickel.    If Protopic and house dust mite reduction doesn't help, we could discuss the use of Dupixent.    These conclusions are made at the best of one's knowledge and belief based on the provided evidence such as patient's history and allergy test results and they can change over time or can be incomplete because of missing information's.    Follow-up in a month.      Staff Involved:    Christopher  Disclosure  I, Patty Mireles, am serving as a scribe to document services personally performed by Dr. Castillo Pace, based on data collection and the provider's statements to me.     I spent a total of 22 minutes face to face with Kaley Magana during today s office visit. Over 50% of this time was spent discussing and concluding the test results, counseling the patient and/or coordinating care. Please see Assessment and Plan for details.

## 2019-12-03 ENCOUNTER — TELEPHONE (OUTPATIENT)
Dept: DERMATOLOGY | Facility: CLINIC | Age: 64
End: 2019-12-03

## 2019-12-03 DIAGNOSIS — L23.9 ALLERGIC CONTACT DERMATITIS, UNSPECIFIED TRIGGER: Primary | ICD-10-CM

## 2019-12-03 NOTE — TELEPHONE ENCOUNTER
[facility] Call Center    Phone Message    May a detailed message be left on voicemail: no    Reason for Call: Medication Question or concern regarding medication   Prescription Clarification  Name of Medication: Tacrolimus  Prescribing Provider: Dr. Pace   Pharmacy: [facility] [place]   What on the order needs clarification? Medication is not covered by insurance.           Action Taken: Message routed to:  [facility] ([facility] Allergy

## 2019-12-04 RX ORDER — PIMECROLIMUS 10 MG/G
CREAM TOPICAL 2 TIMES DAILY
Qty: 30 G | Refills: 3 | Status: SHIPPED | OUTPATIENT
Start: 2019-12-04

## 2019-12-27 ENCOUNTER — OFFICE VISIT (OUTPATIENT)
Dept: ALLERGY | Facility: CLINIC | Age: 64
End: 2019-12-27
Payer: COMMERCIAL

## 2019-12-27 DIAGNOSIS — L30.9 ECZEMA, UNSPECIFIED TYPE: ICD-10-CM

## 2019-12-27 DIAGNOSIS — L20.89 OTHER ATOPIC DERMATITIS: Primary | ICD-10-CM

## 2019-12-27 RX ORDER — PREDNISONE 5 MG/1
TABLET ORAL
Qty: 21 TABLET | Refills: 0 | Status: SHIPPED | OUTPATIENT
Start: 2019-12-27

## 2019-12-27 RX ORDER — MOMETASONE FUROATE 1 MG/G
CREAM TOPICAL
Qty: 15 G | Refills: 0 | Status: SHIPPED | OUTPATIENT
Start: 2019-12-27 | End: 2020-03-31

## 2019-12-27 ASSESSMENT — PAIN SCALES - GENERAL: PAINLEVEL: NO PAIN (0)

## 2019-12-27 NOTE — LETTER
"    12/27/2019         RE: Kaley Magana  [address] [place] [address]        Dear Colleague,    Thank you for referring your patient, Kaley Magana, to the [facility] ALLERGY. Please see a copy of my visit note below.    [facility] Allergy Note    Allergy Clinic  [facility]  [facility]  [address] [place] [id number]    Allergy Problem List:    Specialty Problems     None        CC:   Allergy Testing Followup (one month follow up -states she is not doing well, \"there is somethine going on\" stating rash on hands with drainage and swelling. States tacrolimus only irriatated her hands. )      Encounter Date: Dec 27, 2019    History of Present Illness:  Ms. Kaley Magana is a 64 year old female who presents as a referral from a dermatologist Tamika. She has had eczema on her hands, abdomen, arms, and around her mouth for the last 2 years. Was started on lisinopril in 2017 to control her blood pressure and she developed leg edema as well as spots on her legs.   A biopsy was taken which revealed venous stasis dermatitis and the lisinopril was discontinued. Currently uses metoprolol and wears compression stockings. While the venous stasis resolved, she developed eczema on other parts of her body. Treats until eczema is resolved but develops eczema again a few days later. She has been using betamethasone 0.05% ointment 2-4 times per day for 3-4 times per week for over a year. She currently has eczema on her left abdomen, left upper arm, right elbow, hands, and under her left nose.   Has eliminated all fragrances from her hygiene care and clothing care to see if there would be any improvement but did not notice any changes. Has tried wearing cotton gloves with lotion with no improvement. She reports that it seems using lotion causes more irritation than improvement. Has tried using Vaseline alone and with betamethasone with no improvement. Has " tried continuing to apply betamethasone to areas of eczema for a few days after resolution without any change in the amount of time for the eczema to reappear. Reports that she has developed blisters with yellow secretions which she took a picture of on her phone. Denies having asthma or allergies.   Has rhinoconjunctivitis throughout the fall which started a few years ago.   Has noticed hand irritation after wearing rubber gloves. Stress causes her eczema to worsen. Dyes hair. Does not pain nails. Daughter has severe allergies.   Patient brought in a list of current medications, supplements, and lotions as of November 2019: metoprolol 25 mg (2+ years), simvastatin 20mg (4+ years, synthroid 25 mcg (3 months), Maxalt 10mg (5+ years prn), betamethasone 0.05% ointment (2+ years, may just be 1+ years), hydrocortisone 2.5% (1+ years), [employer] eye vitamin, multivitamin, biotin, calcium/vitamin D, Blink eye drops, antacids for acid reflux, CeraVe, Vanicream, and Sarna lotion.    Hx since 11/27/19:  The patient comes in today for patch testing day 5. Reports that last night she developed a lesion on the L thumb that she would like examined today. The patient is otherwise feeling well overall. No other allergy concerns at this time.     Hx since 11/29/19:  The patient comes in today for follow up of delayed-reaction to her intradermal tests. Of note, there was significant infiltration and increased erythema over the test field sites of D. Farinae and D. Pteronyssinus on her R upper extremity.  She shares that this occurred about 3 days after injection.     Hx since 12/2/19:  The patient returns in clinic today for a 1 month followup. She last saw us in the allergy clinic on 12/2/19 for a delayed-reaction to dust mites. At the time she was to reduce house dust mites as much as possible in the bedroom. She was prescribed Protopic 0.1% ointment to be applied BID on the upper extremities, hands and face. She was to continue  with CeraVe for hand and body wash.   Today she reports that the eczema of the hands have worsened about a week ago despite using the Protopic in the interim. She used the Protopic twice daily for about 2-3 weeks. She stopped using it on Tuesday of last week. Over the holidays she had her children wash the dishes due to hand symptoms; denies that there was any stress exacerbating symptoms. Did not wear any nail polish or artificial nails. Did not use glue.  Other affected areas include the R elbow and L proximal upper extremity.    She shares that she also uses a mousse with spray for her hair, followed by brushing and drying of the hair.     Also reports that in the last couple of weeks she began having dry skin of the hands.     She is using betamethasone; last applied this last Tuesday.     She is working at current.    She is otherwise feeling well overall today. No other skin or allergy concerns at this time.       Past Medical History:   There is no problem list on file for this patient.    No past medical history on file.    Allergy History:     Allergies   Allergen Reactions     Amlodipine Swelling     Doxycycline Rash     Sulfamethoxazole-Trimethoprim Rash       Social History:  The patient works at the  of a physical therapy office. Patient has the following hobbies or non-occupational exposure: reads, enjoys being on her porch particularly when it is summer.     reports that she has never smoked. She has never used smokeless tobacco.      Family History:  No family history on file.    Medications:  Current Outpatient Medications   Medication Sig Dispense Refill     betamethasone dipropionate (DIPROSONE) 0.05 % external ointment        Elastic Bandages & Supports (FUTURO RESTORING DRESS SOCKS) MISC Use daily for leg swelling.       hydrocortisone 2.5 % ointment        levothyroxine (SYNTHROID/LEVOTHROID) 25 MCG tablet Take 25 mcg by mouth       metoprolol succinate ER (TOPROL-XL) 25 MG 24 hr  tablet Take 25 mg by mouth       pimecrolimus (ELIDEL) 1 % external cream Apply topically 2 times daily 30 g 3     rizatriptan (MAXALT) 10 MG tablet        simvastatin (ZOCOR) 20 MG tablet        tacrolimus (PROTOPIC) 0.1 % external ointment Apply topically 2 times daily On hands and arms and face    Use for hair wash Free&Clear Shampoo and Conditioner 60 g 3       Review of Systems:  -As per HPI  -Constitutional: The patient denies fatigue, fevers, chills, unintended weight loss, and night sweats.  -HEENT: Patient denies nonhealing oral sores.  -Skin: As above in HPI. No additional skin concerns.    Physical exam:  Vitals: There were no vitals taken for this visit.  GEN: This is a well developed, well-nourished female in no acute distress, in a pleasant mood.    SKIN: Focused examination of the hands were performed.  - Acute eczema on dorsal part of R hand on distal phalynx  -No other lesions of concern on areas examined     Allergy Tests:    Order for PATCH TESTS    [] Outpatient  [] Inpatient: [facility]..../ Bed ....      Skin Atopy (atopic dermatitis) [] Yes   [x] No  Rhinitis/Sinusitis:   [x] Yes   [] No Fall  Allergic Asthma:   [] Yes   [x] No  Food Allergy:   [] Yes   [x] No  Leg ulcers:   [] Yes   [x] No  Hand eczema:   [x] Yes   [] No   Leading hand:   [x] R   [] L       [] Ambidextrous                        Reason for tests (suspected allergy): acute to subacute dermatitis on back of hands, extensor part of arms and perioral  Known previous allergies: none    Standardized panels  [x] Standard panel (40 tests)  [x] Preservatives & Antimicrobials (31 tests)  [x] Emulsifiers & Additives (25 tests)   [x] Perfumes/Flavours & Plants (25 tests)  [x] Hairdresser panel (12 tests)  [x] Rubber Chemicals (22 tests)  [] Plastics (26 tests)  [] Colorants/Dyes/Food additives (20 tests)  [] Metals (implants/dental) (24 tests)  [] Local anaesthetics/NSAIDs (13 tests)  [] Antibiotics & Antimycotics (14 tests)   []  Corticosteroids (15 tests)   [] Photopatch test (62 tests)   [] others: ...      [] Patient's own products: ...    DO NOT test if chemical or biological identity is unknown!     always ask from patient the product information and safety sheets (MSDS)   [x] Atopy screen prick test (Atopic predisposition): ...    [] Patient needs consultation with Allergy team (changes of tests may apply)  [] Tests discussed with Allergy team (can have direct appointment for patch tests)    Atopy Screen (Placed 11/25/19 )    No Substance Readings (15 min) Evaluation   POS Histamine 1mg/ml ++    NEG NaCl 0.9% -      No Substance Readings (15 min) Evaluation   1 Alternaria alternata (tenuis)  -    2 Cladosporium herbarum -    3 Aspergillus fumigatus -    4 Penicillium notatum -    5 Dermatophagoides pteronyssinus -    6 Dermatophagoides farinae -    7 Dog epithelium (canis spp) -    8 Cat hair (trinity catus) -    9 Cockroach   (Blatella americana & germanica) -    10 Grass mix midwest   (Nury, Orchard, Redtop, Ralu) -    11 Cornel grass (sorghum halepense) -    12 Weed mix   (common Cocklebur, Lamb s quarters, rough redroot Pigweed, Dock/Sorrel) -    13 Mug wort (artemisia vulgare) -    14 Ragweed giant/short (ambrosia spp) -    15 English Plantain (plantago lanceolata) -    16 Tree mix 1 (Pecan, Maple BHR, Oak RVW, american [place], black [place]) -    17 Red cedar (juniperus virginia) -    18 Tree mix 2   (white Mathew, river/red Birch, black [place], common Bertie, american Elm) -    19 Box elder/Maple mix (acer spp) -    20 [place] shagbark (carya ovata) -       -      Conclusion: Negative atopy screen prick test. No atopic predisposition.    RESULTS & EVALUATION of PATCH TESTS    Patch test readings after     [x] 2 days, [] 3 days [x] 4 days, [] 5 days,    Applied patch tests with results (import here the list of patch tests):  Order documented by: ARACELIS JONES  Order reviewed by: Mimi Montenegro LPN   Physician:    Date/time of application: 11/25/2019  Localization of application: Back >>> no eczema or lesions     STANDARD Series         No Substance 2 days 4 days remarks   1 Jayy Mix [C] - -    2 Colophony - -    3  2-Mercaptobenzothiazole  - -     4 Methylisothiazolinone - -    5 Carba Mix - -    6 Thiuram Mix [A] - -    7 Bisphenol A Epoxy Resin - -    8 U-Nvjh-Vtgktoumhac-Formaldehyde Resin - -    9 Mercapto Mix [A] - -    10 Black Rubber Mix- PPD [B] - -    11 Potassium Dichromate  +  -    12 Balsam of [place] (Myroxylon Pereirae Resin) - -    13 Nickel Sulphate Hexahydrate - +/++    14 Mixed Dialkyl Thiourea - -    15 Paraben Mix [B] - -    16 Methyldibromo Glutaronitrile + -    17 Fragrance Mix - -    18 2-Bromo-2-Nitropropane-1,3-Diol (Bronopol) - -    19 Lyral - -    20 Tixocortol-21- Pivalate - -    21 Diazolidiyl Urea (Germall II) - -    22 Methyl Methacrylate - -    23 Cobalt (II) Chloride Hexahydrate NA NA    24 Fragrance Mix II  - -    25 Compositae Mix - -    26 Benzoyl Peroxide - -    27 Bacitracin + +    28 Formaldehyde - -    29 Methylchloroisothiazolinone / Methylisothiazolinone - -    30 Corticosteroid Mix - -    31 Sodium Lauryl Sulfate - -    32 Lanolin Alcohol - -    33 Turpentine - -    34 Cetylstearylalcohol - -    35 Chlorhexidine Dicluconate + -    36 Budenoside - -    37 Imidazolidinyl Urea  - -    38 Ethyl-2 Cyanoacrylate NA NA    39 Quaternium 15 (Dowicil 200) - -    40 Decyl Glucoside - -      EMULSIFIERS & ADDITIVES        No Substance 2 days 4 days remarks   41 Polyethylene Glycol-400 - -    42 Cocamidopropyl Betaine - -    43 Amerchol L101 NA NA    44 Propylene Glycol - -    45 Triethanolamine - -    46 Sorbitane Sesquiolate - -    47 Isopropylmyristate - -    48 Polysorbate 80  - -    49 Amidoamine   (Stearamidopropyl Dimethylamine) - -    50 Oleamidopropyl Dimethylamine - -    51 Lauryl Glucoside - -    52 Coconut Diethanolamide  - -    53 2-Hydroxy-4-Methoxy Benzophenone  (Oxybenzone) - -    54 Benzophenone-4 (Sulisobenzon) - -    55 Propolis - -    56 Dexpanthenol - -    57 Carboxymethyl Cellulose Sodium - -    58 Abitol - -    59 Tert-Butylhydroquinone - -    60 Benzyl Salicylate - -     Antioxidant      61 Dodecyl Gallate - -    62 Butylhydroxyanisole (BHA) - -    63 Butylhydroxytoluene (BHT) - -    64 Di-Alpha-Tocopherol (Vit E) - -    65 Propyl Gallate - -      PERFUMES, FLAVORS & PLANTS         No Substance 2 days 4 days remarks   66 Benzyl Salicylate - -    67 Benzyl Cinnamate - -    68 Di-Limonene (Dipentene) NA NA    69 Cananga Odorata (Christopher White) (I) - -    70 Lichen Acid Mix - -    71 Mentha Piperita Oil (Peppermint Oil) - -    72 Sesquiterpenelactone mix - -    73 Tea Tree Oil, Oxidized - -    74 Wood Tar Mix - -    75 Abietic Acid NA NA    76 Lavendula Angustifolia Oil (Lavender Oil) - -    77 Camphor  NA NA     Fragrance Mix I      78 Oakmoss Absolute - -    79 Eugenol - -    80 Geraniol - -    81 Hydroxycitronellal - -    82 Isoeugenol - -    83 Cinnamic Aldehyde - -    84 Cinnamic Alcohol  - -    85 Sorbitane Sesquioleate NA NA     Fragrance mix II      86 Citronellol - -    87 Alpha-Hexylcinnamic Aldehyde    - -    88 Citral - -    89 Farnesol - -    90 Coumarin - -      PRESERVATIVES & ANTIMICROBIALS         No Substance 2 days 4 days remarks   91  1,2-Benzisothiazoline-3-One, Sodium Salt - -    92  1,3,5-Gordon (2-Hydroxyethyl) - Hexahydrotriazine (Grotan BK) - -    93 2-Woiznotomrips-4-Nitro-1, 3-Propanediol - -    94  3, 4, 4' - Triclocarban - -    95 4 - Chloro - 3 - Cresol - -    96 4 - Chloro - 4 - Xylenol (PCMX) - -    97 7-Ethylbicyclooxazolidine (Bioban TV0037) - -    98 Benzalkonium Chloride - -    99 Benzyl Alcohol - -    100 Cetalkonium Chloride - -    101 Cetylpyrimidine Chloride  - -    102 Chloroacetamide - -    103 DMDM Hydantoin - -    104 Glutaraldehyde - -    105 Triclosan NA NA    106 Glyoxal Trimeric Dihydrate - -    107 Iodopropynyl  Butylcarbamate - -    108 Octylisothiazoline - -    109 Iodoform - -    110 (Nitrobutyl) Morpholine/(Ethylnitro-Trimethylene) Dimorpholine (Bioban P 1487) - -    111 Phenoxyethanol - -    112 Phenyl Salicylate - -    113 Povidone Iodine - -    114 Sodium Benzoate - -    115 Sodium Disulfite - -    116 Sorbic Acid NA NA    117 Thimerosal - -     Parabens      118 Butyl-P-Hydroxybenzoate - -    119 Ethyl-P-Hydroxybenzoate - -    120 Methyl-P-Hydroxybenzoate - -    121 Propyl-P-Hydroxybenzoate - -      RUBBER CHEMICALS         No Substance 2 days 4 days remarks    Carbamate      122 Zinc Bis ( Diethyldithio carbamate ) (ZDEC) - -    123 Zinc Bis (Dibutyldithiocarbamate) - -    124 1,3-Diphenylguanidine (DPG) - -     Thiourea      125 Dibutylthiourea - -    126 Diphenyltiourea - -    127 Thiourea - -     Mercapto chemicals      128 Morpholinyl Mercaptobenzothiazole - -    129 M-Vdfflatqlm-0-Benzothiazyl-Sulfenamide - -    130 Dibenzothiazyl Disulfide - -     Thiuram chemicals      131 Dipentamethylenethiuram Disulfide - -    132 Tetraethylthiuram Disulfide (Disulfiram) - -    133 Tetramethylthiuram Disulfide - -    134 Tetramethyl Thiuram Monosulfide (TMTM) - -     4-Phenylenediamine derivatives      135 N-Isopropyl-N'-Phenyl-P-Phenylenediamine (IPPD) - -    136 Rdjncpcj-K-Ericmdoeoybeqlnr (DPPD) - -     Various Rubber Additives      137 Hydroquinone Monobenzylether  - -    138 Hexamethylenetetramine - -    139 4,4'-Dihydroxybiphenyl - -    140 Cyclohexylthiophtalimide - -    141 Ethylenediamine Dihydrochloride - -    142 N-Phenyl-B-Naphthylamine - -    143 Dodecyl Mercaptan - -      HAIRDRESSER Series         No Substance 2 days 4 days remarks   144 P-Phenylenediamin  -  -    145 P-Toluenediamine Sulphate  -  -    146 Ammonium Thioglycolate - -    147 Ammonium Persulfate - -    148 Resorcinol - -    149 3-Aminophenol - -    150 P-Aminophenol - -    151 Glyceryl Monothioglycolate - -    152 Zinc Pyrithione  - -    153  Pyrogallol - -    154 P-Aminodiphenylamine - -    155 Dimethylaminopropylamin (DMAPA) - -      Results of patch tests:                         Interpretation:    - Negative                    A    = Allergic      (+) Erythema    TI   = Toxic/irritant   + E + Infiltration    RaP = Relevance at Present     ++ E/I + Papulovesicle   Rpr  = Relevance Previously     +++ E/I/P + Blister     nR   = No Relevance    [x] No relevant allergic reaction observed:  Standard series:  Nickel Sulphate Hexahydrate +/++  Bacitracin +       Intraderrmal Testing (Placed November 29, 2019)    No Substance Conc.  Readings (15min) Evaluation   1 NaCl  0.9% -    2 Histamine  0.1mg / ml ++ 13mmP/20mmE   4 Standard Dust Mite - D. Farinae 1:10 +/++ 7mmP/10mmE   5 Standard Dust Mite - D. Pteronyssinus 1:10 + 8mmP/8mmE   10 Aspergillus fumigatus  1:10 - 3mmP/3mmE   11 Penicillium notatum 1:10 -      Conclusion:   Patient has a slight reaction to the house dust mites which would show that she has an atopic predisposition and this could explain as well that the hand eczema is not due to a contact allergy but as a minimal of atopic dermatitis.  Delayed reactions --> 3 days after, D. Farinae has an infiltration of about 15mm and 20mm erythema and D. Pteronyssinus had infiltration of about 13mm and 15mm erythema    Interpretation/ remarks:   Patient seems to have a contact sensitization to the nickel and less to bacitracin. The nickel allergy would fit well into the atopic dermatitis but it probably doesn't have relevance for the dermatitis on the hands and in the face, as well for the bacitracin the patient doesn't really use this.    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 Pteronyssinus and D. Farinae negatives!    Acute flare ups on right fingers DDx irritant, contact  allergy or aggravation by house dust mite    --> Very strong immediate and delayed type sensitivity to the house dust mite D. Farinae and D. Pteronyssinus    --> Contact allergy  to nickel and bacitracin (reaction to nickel correlates well with atopy)     ==> Treatment prescribed/Plan:    We previously discussed with the patient IT, but we think it will not be effective in this case because it is a delayed-type allergy and not an immediate-type allergy    Continue to reduce house dust mites in the bedroom and bed as much as possible. Information was previously given.     Stop the Protopic 0.1% ointment that was to be applied topically twice daily on arms, hands and face where symptoms occur.    Continue using CeraVe for hand and body wash.     Continue with Free and Clear shampoo and conditioner. Patient has to make sure that the hair dye doesn't contain Nickel as well.     Prescribed Prednisone taper at 30 mg to be reduced by 5mg daily for x6 days.     Prescribed Eucrisa to be applied to affected areas from Monday through Friday 1-2x daily - pending insurance.    Prescribed mometasone cream to be used daily for a week on the hands and then twice weekly on Saturday and Sunday.    Discussed with the patient UV treatment for the hand eczema. The patient will work on finding a dermatologist near her in [place] to start this.    Plan for patch tests with the patient's organic shampoo, gel, and hair mousse, acrylates and corticosteroid and Protopic, and colors/food and antibiotic panel in a month if not better.    If no improvement, will consider Dupixent as a last resort. Discussed risks and benefits, and the patient would like to proceed.    These conclusions are made at the best of one's knowledge and belief based on the provided evidence such as patient's history and allergy test results and they can change over time or can be incomplete because of missing information's.    Follow-up in 3 weeks.    Staff  Involved:    Scribe Disclosure  I, Patty Mireles, am serving as a scribe to document services personally performed by Dr. Castillo Pace, based on data collection and the provider's statements to me.     Start Time: 7:11 AM  End Time: 7:37 AM    I spent a total of 26 minutes face to face with Kaley Magana during today s office visit. Over 50% of this time was spent counseling the patient and/or coordinating care. Please see Assessment and Plan for details.          Again, thank you for allowing me to participate in the care of your patient.        Sincerely,        Castillo Pace MD

## 2019-12-27 NOTE — NURSING NOTE
"Chief Complaint   Patient presents with     Allergy Testing Followup     one month follow up -states she is not doing well, \"there is somethine going on\" stating rash on hands with drainage and swelling. States tacrolimus only irriatated her hands.    Kell Sanchez LPN      "

## 2019-12-27 NOTE — PROGRESS NOTES
"[facility] Allergy Note    Allergy Clinic  [facility]  [address] [place] [address]    Allergy Problem List:    Specialty Problems     None        CC:   Allergy Testing Followup (one month follow up -states she is not doing well, \"there is somethine going on\" stating rash on hands with drainage and swelling. States tacrolimus only irriatated her hands. )      Encounter Date: Dec 27, 2019    History of Present Illness:  Ms. Kaley Magana is a 64 year old female who presents as a referral from a dermatologist Tamika. She has had eczema on her hands, abdomen, arms, and around her mouth for the last 2 years. Was started on lisinopril in 2017 to control her blood pressure and she developed leg edema as well as spots on her legs.   A biopsy was taken which revealed venous stasis dermatitis and the lisinopril was discontinued. Currently uses metoprolol and wears compression stockings. While the venous stasis resolved, she developed eczema on other parts of her body. Treats until eczema is resolved but develops eczema again a few days later. She has been using betamethasone 0.05% ointment 2-4 times per day for 3-4 times per week for over a year. She currently has eczema on her left abdomen, left upper arm, right elbow, hands, and under her left nose.   Has eliminated all fragrances from her hygiene care and clothing care to see if there would be any improvement but did not notice any changes. Has tried wearing cotton gloves with lotion with no improvement. She reports that it seems using lotion causes more irritation than improvement. Has tried using Vaseline alone and with betamethasone with no improvement. Has tried continuing to apply betamethasone to areas of eczema for a few days after resolution without any change in the amount of time for the eczema to reappear. Reports that she has developed blisters with yellow secretions which she took " a picture of on her phone. Denies having asthma or allergies.   Has rhinoconjunctivitis throughout the fall which started a few years ago.   Has noticed hand irritation after wearing rubber gloves. Stress causes her eczema to worsen. Dyes hair. Does not pain nails. Daughter has severe allergies.   Patient brought in a list of current medications, supplements, and lotions as of November 2019: metoprolol 25 mg (2+ years), simvastatin 20mg (4+ years, synthroid 25 mcg (3 months), Maxalt 10mg (5+ years prn), betamethasone 0.05% ointment (2+ years, may just be 1+ years), hydrocortisone 2.5% (1+ years), [employer] eye vitamin, multivitamin, biotin, calcium/vitamin D, Blink eye drops, antacids for acid reflux, CeraVe, Vanicream, and Sarna lotion.    Hx since 11/27/19:  The patient comes in today for patch testing day 5. Reports that last night she developed a lesion on the L thumb that she would like examined today. The patient is otherwise feeling well overall. No other allergy concerns at this time.     Hx since 11/29/19:  The patient comes in today for follow up of delayed-reaction to her intradermal tests. Of note, there was significant infiltration and increased erythema over the test field sites of D. Farinae and D. Pteronyssinus on her R upper extremity.  She shares that this occurred about 3 days after injection.     Hx since 12/2/19:  The patient returns in clinic today for a 1 month followup. She last saw us in the allergy clinic on 12/2/19 for a delayed-reaction to dust mites. At the time she was to reduce house dust mites as much as possible in the bedroom. She was prescribed Protopic 0.1% ointment to be applied BID on the upper extremities, hands and face. She was to continue with CeraVe for hand and body wash.   Today she reports that the eczema of the hands have worsened about a week ago despite using the Protopic in the interim. She used the Protopic twice daily for about 2-3 weeks. She stopped using it on  Tuesday of last week. Over the holidays she had her children wash the dishes due to hand symptoms; denies that there was any stress exacerbating symptoms. Did not wear any nail polish or artificial nails. Did not use glue.  Other affected areas include the R elbow and L proximal upper extremity.    She shares that she also uses a mousse with spray for her hair, followed by brushing and drying of the hair.     Also reports that in the last couple of weeks she began having dry skin of the hands.     She is using betamethasone; last applied this last Tuesday.     She is working at current.    She is otherwise feeling well overall today. No other skin or allergy concerns at this time.       Past Medical History:   There is no problem list on file for this patient.    No past medical history on file.    Allergy History:     Allergies   Allergen Reactions     Amlodipine Swelling     Doxycycline Rash     Sulfamethoxazole-Trimethoprim Rash       Social History:  The patient works at the  of a physical therapy office. Patient has the following hobbies or non-occupational exposure: reads, enjoys being on her porch particularly when it is summer.     reports that she has never smoked. She has never used smokeless tobacco.      Family History:  No family history on file.    Medications:  Current Outpatient Medications   Medication Sig Dispense Refill     betamethasone dipropionate (DIPROSONE) 0.05 % external ointment        Elastic Bandages & Supports (FUTURO RESTORING DRESS SOCKS) MISC Use daily for leg swelling.       hydrocortisone 2.5 % ointment        levothyroxine (SYNTHROID/LEVOTHROID) 25 MCG tablet Take 25 mcg by mouth       metoprolol succinate ER (TOPROL-XL) 25 MG 24 hr tablet Take 25 mg by mouth       pimecrolimus (ELIDEL) 1 % external cream Apply topically 2 times daily 30 g 3     rizatriptan (MAXALT) 10 MG tablet        simvastatin (ZOCOR) 20 MG tablet        tacrolimus (PROTOPIC) 0.1 % external ointment  Apply topically 2 times daily On hands and arms and face    Use for hair wash Free&Clear Shampoo and Conditioner 60 g 3       Review of Systems:  -As per HPI  -Constitutional: The patient denies fatigue, fevers, chills, unintended weight loss, and night sweats.  -HEENT: Patient denies nonhealing oral sores.  -Skin: As above in HPI. No additional skin concerns.    Physical exam:  Vitals: There were no vitals taken for this visit.  GEN: This is a well developed, well-nourished female in no acute distress, in a pleasant mood.    SKIN: Focused examination of the hands were performed.  - Acute eczema on dorsal part of R hand on distal phalynx  -No other lesions of concern on areas examined     Allergy Tests:    Order for PATCH TESTS    [] Outpatient  [] Inpatient: [facility]..../ Bed ....      Skin Atopy (atopic dermatitis) [] Yes   [x] No  Rhinitis/Sinusitis:   [x] Yes   [] No Fall  Allergic Asthma:   [] Yes   [x] No  Food Allergy:   [] Yes   [x] No  Leg ulcers:   [] Yes   [x] No  Hand eczema:   [x] Yes   [] No   Leading hand:   [x] R   [] L       [] Ambidextrous                        Reason for tests (suspected allergy): acute to subacute dermatitis on back of hands, extensor part of arms and perioral  Known previous allergies: none    Standardized panels  [x] Standard panel (40 tests)  [x] Preservatives & Antimicrobials (31 tests)  [x] Emulsifiers & Additives (25 tests)   [x] Perfumes/Flavours & Plants (25 tests)  [x] Hairdresser panel (12 tests)  [x] Rubber Chemicals (22 tests)  [] Plastics (26 tests)  [] Colorants/Dyes/Food additives (20 tests)  [] Metals (implants/dental) (24 tests)  [] Local anaesthetics/NSAIDs (13 tests)  [] Antibiotics & Antimycotics (14 tests)   [] Corticosteroids (15 tests)   [] Photopatch test (62 tests)   [] others: ...      [] Patient's own products: ...    DO NOT test if chemical or biological identity is unknown!     always ask from patient the product information and safety sheets (MSDS)    [x] Atopy screen prick test (Atopic predisposition): ...    [] Patient needs consultation with Allergy team (changes of tests may apply)  [] Tests discussed with Allergy team (can have direct appointment for patch tests)    Atopy Screen (Placed 11/25/19 )    No Substance Readings (15 min) Evaluation   POS Histamine 1mg/ml ++    NEG NaCl 0.9% -      No Substance Readings (15 min) Evaluation   1 Alternaria alternata (tenuis)  -    2 Cladosporium herbarum -    3 Aspergillus fumigatus -    4 Penicillium notatum -    5 Dermatophagoides pteronyssinus -    6 Dermatophagoides farinae -    7 Dog epithelium (canis spp) -    8 Cat hair (trinity catus) -    9 Cockroach   (Blatella americana & germanica) -    10 Grass mix midwest   (Nury, Orchard, Redtop, Raul) -    11 Cornel grass (sorghum halepense) -    12 Weed mix   (common Cocklebur, Lamb s quarters, rough redroot Pigweed, Dock/Sorrel) -    13 Mug wort (artemisia vulgare) -    14 Ragweed giant/short (ambrosia spp) -    15 English Plantain (plantago lanceolata) -    16 Tree mix 1 (Pecan, Maple BHR, Oak RVW, american Lakeland, black Magness) -    17 Red cedar (juniperus virginia) -    18 Tree mix 2   (white Mathew, river/red Birch, black Waldo, common [place], american Elm) -    19 Box elder/Maple mix (acer spp) -    20 Swift shagbark (carya ovata) -       -      Conclusion: Negative atopy screen prick test. No atopic predisposition.    RESULTS & EVALUATION of PATCH TESTS    Patch test readings after     [x] 2 days, [] 3 days [x] 4 days, [] 5 days,    Applied patch tests with results (import here the list of patch tests):  Order documented by: ARACELIS JONES  Order reviewed by: Mimi Montenegro LPN   Physician:   Date/time of application: 11/25/2019  Localization of application: Back >>> no eczema or lesions     STANDARD Series         No Substance 2 days 4 days remarks   1 Jayy Mix [C] - -    2 Colophony - -    3  2-Mercaptobenzothiazole  - -     4  Methylisothiazolinone - -    5 Carba Mix - -    6 Thiuram Mix [A] - -    7 Bisphenol A Epoxy Resin - -    8 K-Rkmt-Nkfpyudougc-Formaldehyde Resin - -    9 Mercapto Mix [A] - -    10 Black Rubber Mix- PPD [B] - -    11 Potassium Dichromate  +  -    12 Balsam of [place] (Myroxylon Pereirae Resin) - -    13 Nickel Sulphate Hexahydrate - +/++    14 Mixed Dialkyl Thiourea - -    15 Paraben Mix [B] - -    16 Methyldibromo Glutaronitrile + -    17 Fragrance Mix - -    18 2-Bromo-2-Nitropropane-1,3-Diol (Bronopol) - -    19 Lyral - -    20 Tixocortol-21- Pivalate - -    21 Diazolidiyl Urea (Germall II) - -    22 Methyl Methacrylate - -    23 Cobalt (II) Chloride Hexahydrate NA NA    24 Fragrance Mix II  - -    25 Compositae Mix - -    26 Benzoyl Peroxide - -    27 Bacitracin + +    28 Formaldehyde - -    29 Methylchloroisothiazolinone / Methylisothiazolinone - -    30 Corticosteroid Mix - -    31 Sodium Lauryl Sulfate - -    32 Lanolin Alcohol - -    33 Turpentine - -    34 Cetylstearylalcohol - -    35 Chlorhexidine Dicluconate + -    36 Budenoside - -    37 Imidazolidinyl Urea  - -    38 Ethyl-2 Cyanoacrylate NA NA    39 Quaternium 15 (Dowicil 200) - -    40 Decyl Glucoside - -      EMULSIFIERS & ADDITIVES        No Substance 2 days 4 days remarks   41 Polyethylene Glycol-400 - -    42 Cocamidopropyl Betaine - -    43 Amerchol L101 NA NA    44 Propylene Glycol - -    45 Triethanolamine - -    46 Sorbitane Sesquiolate - -    47 Isopropylmyristate - -    48 Polysorbate 80  - -    49 Amidoamine   (Stearamidopropyl Dimethylamine) - -    50 Oleamidopropyl Dimethylamine - -    51 Lauryl Glucoside - -    52 Coconut Diethanolamide  - -    53 2-Hydroxy-4-Methoxy Benzophenone (Oxybenzone) - -    54 Benzophenone-4 (Sulisobenzon) - -    55 Propolis - -    56 Dexpanthenol - -    57 Carboxymethyl Cellulose Sodium - -    58 Abitol - -    59 Tert-Butylhydroquinone - -    60 Benzyl Salicylate - -     Antioxidant      61 Dodecyl Gallate - -     62 Butylhydroxyanisole (BHA) - -    63 Butylhydroxytoluene (BHT) - -    64 Di-Alpha-Tocopherol (Vit E) - -    65 Propyl Gallate - -      PERFUMES, FLAVORS & PLANTS         No Substance 2 days 4 days remarks   66 Benzyl Salicylate - -    67 Benzyl Cinnamate - -    68 Di-Limonene (Dipentene) NA NA    69 Cananga Odorata (Christopher White) (I) - -    70 Lichen Acid Mix - -    71 Mentha Piperita Oil (Peppermint Oil) - -    72 Sesquiterpenelactone mix - -    73 Tea Tree Oil, Oxidized - -    74 Wood Tar Mix - -    75 Abietic Acid NA NA    76 Lavendula Angustifolia Oil (Lavender Oil) - -    77 Camphor  NA NA     Fragrance Mix I      78 Oakmoss Absolute - -    79 Eugenol - -    80 Geraniol - -    81 Hydroxycitronellal - -    82 Isoeugenol - -    83 Cinnamic Aldehyde - -    84 Cinnamic Alcohol  - -    85 Sorbitane Sesquioleate NA NA     Fragrance mix II      86 Citronellol - -    87 Alpha-Hexylcinnamic Aldehyde    - -    88 Citral - -    89 Farnesol - -    90 Coumarin - -      PRESERVATIVES & ANTIMICROBIALS         No Substance 2 days 4 days remarks   91  1,2-Benzisothiazoline-3-One, Sodium Salt - -    92  1,3,5-Gordon (2-Hydroxyethyl) - Hexahydrotriazine (Grotan BK) - -    93 3-Dpjksoqgduutm-2-Nitro-1, 3-Propanediol - -    94  3, 4, 4' - Triclocarban - -    95 4 - Chloro - 3 - Cresol - -    96 4 - Chloro - 4 - Xylenol (PCMX) - -    97 7-Ethylbicyclooxazolidine (Bioban [id number]) - -    98 Benzalkonium Chloride - -    99 Benzyl Alcohol - -    100 Cetalkonium Chloride - -    101 Cetylpyrimidine Chloride  - -    102 Chloroacetamide - -    103 DMDM Hydantoin - -    104 Glutaraldehyde - -    105 Triclosan NA NA    106 Glyoxal Trimeric Dihydrate - -    107 Iodopropynyl Butylcarbamate - -    108 Octylisothiazoline - -    109 Iodoform - -    110 (Nitrobutyl) Morpholine/(Ethylnitro-Trimethylene) Dimorpholine ([facility] P 1487) - -    111 Phenoxyethanol - -    112 Phenyl Salicylate - -    113 Povidone Iodine - -    114 Sodium Benzoate - -    115  Sodium Disulfite - -    116 Sorbic Acid NA NA    117 Thimerosal - -     Parabens      118 Butyl-P-Hydroxybenzoate - -    119 Ethyl-P-Hydroxybenzoate - -    120 Methyl-P-Hydroxybenzoate - -    121 Propyl-P-Hydroxybenzoate - -      RUBBER CHEMICALS         No Substance 2 days 4 days remarks    Carbamate      122 Zinc Bis ( Diethyldithio carbamate ) (ZDEC) - -    123 Zinc Bis (Dibutyldithiocarbamate) - -    124 1,3-Diphenylguanidine (DPG) - -     Thiourea      125 Dibutylthiourea - -    126 Diphenyltiourea - -    127 Thiourea - -     Mercapto chemicals      128 Morpholinyl Mercaptobenzothiazole - -    129 Y-Jgbuuvvzvi-3-Benzothiazyl-Sulfenamide - -    130 Dibenzothiazyl Disulfide - -     Thiuram chemicals      131 Dipentamethylenethiuram Disulfide - -    132 Tetraethylthiuram Disulfide (Disulfiram) - -    133 Tetramethylthiuram Disulfide - -    134 Tetramethyl Thiuram Monosulfide (TMTM) - -     4-Phenylenediamine derivatives      135 N-Isopropyl-N'-Phenyl-P-Phenylenediamine (IPPD) - -    136 Xqmfnrfi-F-Imahxxjiwvbojwsj (DPPD) - -     Various Rubber Additives      137 Hydroquinone Monobenzylether  - -    138 Hexamethylenetetramine - -    139 4,4'-Dihydroxybiphenyl - -    140 Cyclohexylthiophtalimide - -    141 Ethylenediamine Dihydrochloride - -    142 N-Phenyl-B-Naphthylamine - -    143 Dodecyl Mercaptan - -      HAIRDRESSER Series         No Substance 2 days 4 days remarks   144 P-Phenylenediamin  -  -    145 P-Toluenediamine Sulphate  -  -    146 Ammonium Thioglycolate - -    147 Ammonium Persulfate - -    148 Resorcinol - -    149 3-Aminophenol - -    150 P-Aminophenol - -    151 Glyceryl Monothioglycolate - -    152 Zinc Pyrithione  - -    153 Pyrogallol - -    154 P-Aminodiphenylamine - -    155 Dimethylaminopropylamin (DMAPA) - -      Results of patch tests:                         Interpretation:    - Negative                    A    = Allergic      (+) Erythema    TI   = Toxic/irritant   + E +  Infiltration    RaP = Relevance at Present     ++ E/I + Papulovesicle   Rpr  = Relevance Previously     +++ E/I/P + Blister     nR   = No Relevance    [x] No relevant allergic reaction observed:  Standard series:  Nickel Sulphate Hexahydrate +/++  Bacitracin +       Intraderrmal Testing (Placed November 29, 2019)    No Substance Conc.  Readings (15min) Evaluation   1 NaCl  0.9% -    2 Histamine  0.1mg / ml ++ 13mmP/20mmE   4 Standard Dust Mite - D. Farinae 1:10 +/++ 7mmP/10mmE   5 Standard Dust Mite - D. Pteronyssinus 1:10 + 8mmP/8mmE   10 Aspergillus fumigatus  1:10 - 3mmP/3mmE   11 Penicillium notatum 1:10 -      Conclusion:   Patient has a slight reaction to the house dust mites which would show that she has an atopic predisposition and this could explain as well that the hand eczema is not due to a contact allergy but as a minimal of atopic dermatitis.  Delayed reactions --> 3 days after, D. Farinae has an infiltration of about 15mm and 20mm erythema and D. Pteronyssinus had infiltration of about 13mm and 15mm erythema    Interpretation/ remarks:   Patient seems to have a contact sensitization to the nickel and less to bacitracin. The nickel allergy would fit well into the atopic dermatitis but it probably doesn't have relevance for the dermatitis on the hands and in the face, as well for the bacitracin the patient doesn't really use this.    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 Pteronyssinus and D. Farinae negatives!    Acute flare ups on right fingers DDx irritant, contact allergy or aggravation by house dust mite    --> Very strong immediate and delayed type sensitivity to the house dust mite D. Farinae and D. 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 Information was previously given.     Stop the Protopic 0.1% ointment that was to be applied topically twice daily on arms, hands and face where symptoms occur.    Continue using CeraVe for hand and body wash.     Continue with Free and Clear shampoo and conditioner. Patient has to make sure that the hair dye doesn't contain Nickel as well.     Prescribed Prednisone taper at 30 mg to be reduced by 5mg daily for x6 days.     Prescribed Eucrisa to be applied to affected areas from Monday through Friday 1-2x daily - pending insurance.    Prescribed mometasone cream to be used daily for a week on the hands and then twice weekly on Saturday and Sunday.    Discussed with the patient UV treatment for the hand eczema. The patient will work on finding a dermatologist near her in [place] to start this.    Plan for patch tests with the patient's organic shampoo, gel, and hair mousse, acrylates and corticosteroid and Protopic, and colors/food and antibiotic panel in a month if not better.    If no improvement, will consider Dupixent as a last resort. Discussed risks and benefits, and the patient would like to proceed.    These conclusions are made at the best of one's knowledge and belief based on the provided evidence such as patient's history and allergy test results and they can change over time or can be incomplete because of missing information's.    Follow-up in 3 weeks.    Staff Involved:    Scribe Disclosure  I, Patty Mireles, am serving as a scribe to document services personally performed by Dr. Castillo Pace, based on data collection and the provider's statements to me.     Start Time: 7:11 AM  End Time: 7:37 AM    I spent a total of 26  minutes face to face with Kaley Magana during today s office visit. Over 50% of this time was spent counseling the patient and/or coordinating care. Please see Assessment and Plan for details.

## 2020-01-24 ENCOUNTER — OFFICE VISIT (OUTPATIENT)
Dept: ALLERGY | Facility: CLINIC | Age: 65
End: 2020-01-24
Payer: COMMERCIAL

## 2020-01-24 DIAGNOSIS — L20.81 ATOPIC NEURODERMATITIS: Primary | ICD-10-CM

## 2020-01-24 ASSESSMENT — PAIN SCALES - GENERAL: PAINLEVEL: NO PAIN (0)

## 2020-01-24 NOTE — PROGRESS NOTES
[facility] Allergy Note    Allergy Clinic  [facility]  [facility]  [address] [place] [address]    Allergy Problem List:    Specialty Problems     None        CC:   Allergies (Completed prednisone (cleared everything up) started Eucrisa and starting to break out again, )    Encounter Date: Jan 24, 2020    History of Present Illness:  Ms. Kaley Magana is a 64 year old female who presents as a referral from a dermatologist Tamika. She has had eczema on her hands, abdomen, arms, and around her mouth for the last 2 years. Was started on lisinopril in 2017 to control her blood pressure and she developed leg edema as well as spots on her legs.   A biopsy was taken which revealed venous stasis dermatitis and the lisinopril was discontinued. Currently uses metoprolol and wears compression stockings. While the venous stasis resolved, she developed eczema on other parts of her body. Treats until eczema is resolved but develops eczema again a few days later. She has been using betamethasone 0.05% ointment 2-4 times per day for 3-4 times per week for over a year. She currently has eczema on her left abdomen, left upper arm, right elbow, hands, and under her left nose.   Has eliminated all fragrances from her hygiene care and clothing care to see if there would be any improvement but did not notice any changes. Has tried wearing cotton gloves with lotion with no improvement. She reports that it seems using lotion causes more irritation than improvement. Has tried using Vaseline alone and with betamethasone with no improvement. Has tried continuing to apply betamethasone to areas of eczema for a few days after resolution without any change in the amount of time for the eczema to reappear. Reports that she has developed blisters with yellow secretions which she took a picture of on her phone. Denies having asthma or allergies.   Has rhinoconjunctivitis throughout  the fall which started a few years ago.   Has noticed hand irritation after wearing rubber gloves. Stress causes her eczema to worsen. Dyes hair. Does not pain nails. Daughter has severe allergies.   Patient brought in a list of current medications, supplements, and lotions as of November 2019: metoprolol 25 mg (2+ years), simvastatin 20mg (4+ years, synthroid 25 mcg (3 months), Maxalt 10mg (5+ years prn), betamethasone 0.05% ointment (2+ years, may just be 1+ years), hydrocortisone 2.5% (1+ years), [employer] eye vitamin, multivitamin, biotin, calcium/vitamin D, Blink eye drops, antacids for acid reflux, CeraVe, Vanicream, and Sarna lotion.    Hx since 11/27/19:  The patient comes in today for patch testing day 5. Reports that last night she developed a lesion on the L thumb that she would like examined today. The patient is otherwise feeling well overall. No other allergy concerns at this time.     Hx since 11/29/19:  The patient comes in today for follow up of delayed-reaction to her intradermal tests. Of note, there was significant infiltration and increased erythema over the test field sites of D. Farinae and D. Pteronyssinus on her R upper extremity.  She shares that this occurred about 3 days after injection.     Hx since 12/2/19:  The patient returns in clinic today for a 1 month followup. She last saw us in the allergy clinic on 12/2/19 for a delayed-reaction to dust mites. At the time she was to reduce house dust mites as much as possible in the bedroom. She was prescribed Protopic 0.1% ointment to be applied BID on the upper extremities, hands and face. She was to continue with CeraVe for hand and body wash.   Today she reports that the eczema of the hands have worsened about a week ago despite using the Protopic in the interim. She used the Protopic twice daily for about 2-3 weeks. She stopped using it on Tuesday of last week. Over the holidays she had her children wash the dishes due to hand symptoms;  denies that there was any stress exacerbating symptoms. Did not wear any nail polish or artificial nails. Did not use glue.  Other affected areas include the R elbow and L proximal upper extremity.    She shares that she also uses a mousse with spray for her hair, followed by brushing and drying of the hair.     Also reports that in the last couple of weeks she began having dry skin of the hands.     She is using betamethasone; last applied this last Tuesday.     She is working at current.    She is otherwise feeling well overall today. No other skin or allergy concerns at this time.     Hx since 12/27/19:  The patient comes in today for a 3-week followup. She was last seen in clinic on 12/27/19 for a followup. She was to continue reducing house dust mites in the bedroom and bed, continue CeraVe, Free and Clear shampoo and conditioner. She was prescribed prednisone taper, Eucrisa, and mometasone cream.    She had stopped the Protopic since it didn't work. She used Eucrisa instead, which worked for the first couple of weeks with prednisone. However, she began breaking out, notably along the medial upper extremities.  Reports that there is now swelling at joints of the fingers and she has spots on the arms, stomach, and legs.    The patient is otherwise feeling well overall. There are no other skin or allergy concerns at this time.       Past Medical History:   There is no problem list on file for this patient.    No past medical history on file.    Allergy History:     Allergies   Allergen Reactions     Amlodipine Swelling     Doxycycline Rash     Sulfamethoxazole-Trimethoprim Rash       Social History:  The patient works at the  of a physical therapy office. Patient has the following hobbies or non-occupational exposure: reads, enjoys being on her porch particularly when it is summer.     reports that she has never smoked. She has never used smokeless tobacco.      Family History:  No family history on  file.    Medications:  Current Outpatient Medications   Medication Sig Dispense Refill     betamethasone dipropionate (DIPROSONE) 0.05 % external ointment        crisaborole (EUCRISA) 2 % ointment Once to twice a day, Monday through Friday 60 g 1     Elastic Bandages & Supports (FUTURO RESTORING DRESS SOCKS) MISC Use daily for leg swelling.       hydrocortisone 2.5 % ointment        levothyroxine (SYNTHROID/LEVOTHROID) 25 MCG tablet Take 25 mcg by mouth       metoprolol succinate ER (TOPROL-XL) 25 MG 24 hr tablet Take 25 mg by mouth       mometasone (ELOCON) 0.1 % external cream Use on your hands once a day, Saturday and Sunday 15 g 0     pimecrolimus (ELIDEL) 1 % external cream Apply topically 2 times daily 30 g 3     predniSONE (DELTASONE) 5 MG tablet Start with 6 tablets/30 mg and decrease by 1 tablet/5 mg every day until all tablets are gone 21 tablet 0     rizatriptan (MAXALT) 10 MG tablet        simvastatin (ZOCOR) 20 MG tablet        tacrolimus (PROTOPIC) 0.1 % external ointment Apply topically 2 times daily On hands and arms and face    Use for hair wash Free&Clear Shampoo and Conditioner 60 g 3       Review of Systems:  -As per HPI  -Constitutional: The patient denies fatigue, fevers, chills, unintended weight loss, and night sweats.  -HEENT: Patient denies nonhealing oral sores.  -Skin: As above in HPI. No additional skin concerns.    Physical exam:  Vitals: There were no vitals taken for this visit.  GEN: This is a well developed, well-nourished female in no acute distress, in a pleasant mood.    SKIN: Focused examination of the hands were performed.  - Acute eczema on dorsal part of R hand on distal phalynx  -No other lesions of concern on areas examined     Allergy Tests:    Order for PATCH TESTS    [] Outpatient  [] Inpatient: [facility]..../ Bed ....      Skin Atopy (atopic dermatitis) [] Yes   [x] No  Rhinitis/Sinusitis:   [x] Yes   [] No Fall  Allergic Asthma:   [] Yes   [x] No  Food Allergy:   [] Yes    [x] No  Leg ulcers:   [] Yes   [x] No  Hand eczema:   [x] Yes   [] No   Leading hand:   [x] R   [] L       [] Ambidextrous                        Reason for tests (suspected allergy): acute to subacute dermatitis on back of hands, extensor part of arms and perioral  Known previous allergies: none    Standardized panels  [x] Standard panel (40 tests)  [x] Preservatives & Antimicrobials (31 tests)  [x] Emulsifiers & Additives (25 tests)   [x] Perfumes/Flavours & Plants (25 tests)  [x] Hairdresser panel (12 tests)  [x] Rubber Chemicals (22 tests)  [] Plastics (26 tests)  [] Colorants/Dyes/Food additives (20 tests)  [] Metals (implants/dental) (24 tests)  [] Local anaesthetics/NSAIDs (13 tests)  [] Antibiotics & Antimycotics (14 tests)   [] Corticosteroids (15 tests)   [] Photopatch test (62 tests)   [] others: ...      [] Patient's own products: ...    DO NOT test if chemical or biological identity is unknown!     always ask from patient the product information and safety sheets (MSDS)   [x] Atopy screen prick test (Atopic predisposition): ...    [] Patient needs consultation with Allergy team (changes of tests may apply)  [] Tests discussed with Allergy team (can have direct appointment for patch tests)    Atopy Screen (Placed 11/25/19 )    No Substance Readings (15 min) Evaluation   POS Histamine 1mg/ml ++    NEG NaCl 0.9% -      No Substance Readings (15 min) Evaluation   1 Alternaria alternata (tenuis)  -    2 Cladosporium herbarum -    3 Aspergillus fumigatus -    4 Penicillium notatum -    5 Dermatophagoides pteronyssinus -    6 Dermatophagoides farinae -    7 Dog epithelium (canis spp) -    8 Cat hair (trinity catus) -    9 Cockroach   (Blatella americana & germanica) -    10 Grass mix midwest   (Nury, Orchard, Redtop, Raul) -    11 Cornel grass (sorghum halepense) -    12 Weed mix   (common Cocklebur, Lamb s quarters, rough redroot Pigweed, Dock/Sorrel) -    13 Mug wort (artemisia vulgare) -    14 Ragweed  giant/short (ambrosia spp) -    15 English Plantain (plantago lanceolata) -    16 Tree mix 1 (Pecan, Maple BHR, Oak RVW, american Chunchula, black Arnett) -    17 Red cedar (juniperus virginia) -    18 Tree mix 2   (white Mathew, river/red Birch, black Steele, common [place], american Elm) -    19 Box elder/Maple mix (acer spp) -    20 [place] shagbark (carya ovata) -       -      Conclusion: Negative atopy screen prick test. No atopic predisposition.      Intraderrmal Testing (Placed November 29, 2019)    No Substance Conc.  Readings (15min) Evaluation   1 NaCl  0.9% -    2 Histamine  0.1mg / ml ++ 13mmP/20mmE   4 Standard Dust Mite - D. Farinae 1:10 +/++ 7mmP/10mmE   5 Standard Dust Mite - D. Pteronyssinus 1:10 + 8mmP/8mmE   10 Aspergillus fumigatus  1:10 - 3mmP/3mmE   11 Penicillium notatum 1:10 -      Conclusion:   Patient has a slight reaction to the house dust mites which would show that she has an atopic predisposition and this could explain as well that the hand eczema is not due to a contact allergy but as a minimal of atopic dermatitis.  Delayed reactions --> 3 days after, D. Farinae has an infiltration of about 15mm and 20mm erythema and D. Pteronyssinus had infiltration of about 13mm and 15mm erythema    RESULTS & EVALUATION of PATCH TESTS    Patch test readings after     [x] 2 days, [] 3 days [x] 4 days, [] 5 days,    Applied patch tests with results (import here the list of patch tests):  Order documented by: ARACELIS JONES  Order reviewed by: Mimi Montenegro LPN   Physician:   Date/time of application: 11/25/2019  Localization of application: Back >>> no eczema or lesions     STANDARD Series         No Substance 2 days 4 days remarks   1 Jayy Mix [C] - -    2 Colophony - -    3  2-Mercaptobenzothiazole  - -     4 Methylisothiazolinone - -    5 Carba Mix - -    6 Thiuram Mix [A] - -    7 Bisphenol A Epoxy Resin - -    8 Y-Jzye-Yaiodmufoen-Formaldehyde Resin - -    9 Mercapto Mix [A]  - -    10 Black Rubber Mix- PPD [B] - -    11 Potassium Dichromate  +  -    12 Balsam of [place] (Myroxylon Pereirae Resin) - -    13 Nickel Sulphate Hexahydrate - +/++    14 Mixed Dialkyl Thiourea - -    15 Paraben Mix [B] - -    16 Methyldibromo Glutaronitrile + -    17 Fragrance Mix - -    18 2-Bromo-2-Nitropropane-1,3-Diol (Bronopol) - -    19 Lyral - -    20 Tixocortol-21- Pivalate - -    21 Diazolidiyl Urea (Germall II) - -    22 Methyl Methacrylate - -    23 Cobalt (II) Chloride Hexahydrate NA NA    24 Fragrance Mix II  - -    25 Compositae Mix - -    26 Benzoyl Peroxide - -    27 Bacitracin + +    28 Formaldehyde - -    29 Methylchloroisothiazolinone / Methylisothiazolinone - -    30 Corticosteroid Mix - -    31 Sodium Lauryl Sulfate - -    32 Lanolin Alcohol - -    33 Turpentine - -    34 Cetylstearylalcohol - -    35 Chlorhexidine Dicluconate + -    36 Budenoside - -    37 Imidazolidinyl Urea  - -    38 Ethyl-2 Cyanoacrylate NA NA    39 Quaternium 15 (Dowicil 200) - -    40 Decyl Glucoside - -      EMULSIFIERS & ADDITIVES        No Substance 2 days 4 days remarks   41 Polyethylene Glycol-400 - -    42 Cocamidopropyl Betaine - -    43 Amerchol L101 NA NA    44 Propylene Glycol - -    45 Triethanolamine - -    46 Sorbitane Sesquiolate - -    47 Isopropylmyristate - -    48 Polysorbate 80  - -    49 Amidoamine   (Stearamidopropyl Dimethylamine) - -    50 Oleamidopropyl Dimethylamine - -    51 Lauryl Glucoside - -    52 Coconut Diethanolamide  - -    53 2-Hydroxy-4-Methoxy Benzophenone (Oxybenzone) - -    54 Benzophenone-4 (Sulisobenzon) - -    55 Propolis - -    56 Dexpanthenol - -    57 Carboxymethyl Cellulose Sodium - -    58 Abitol - -    59 Tert-Butylhydroquinone - -    60 Benzyl Salicylate - -     Antioxidant      61 Dodecyl Gallate - -    62 Butylhydroxyanisole (BHA) - -    63 Butylhydroxytoluene (BHT) - -    64 Di-Alpha-Tocopherol (Vit E) - -    65 Propyl Gallate - -      PERFUMES, FLAVORS & PLANTS          No Substance 2 days 4 days remarks   66 Benzyl Salicylate - -    67 Benzyl Cinnamate - -    68 Di-Limonene (Dipentene) NA NA    69 Cananga Odorata (Christopher White) (I) - -    70 Lichen Acid Mix - -    71 Mentha Piperita Oil (Peppermint Oil) - -    72 Sesquiterpenelactone mix - -    73 Tea Tree Oil, Oxidized - -    74 Wood Tar Mix - -    75 Abietic Acid NA NA    76 Lavendula Angustifolia Oil (Lavender Oil) - -    77 Camphor  NA NA     Fragrance Mix I      78 Oakmoss Absolute - -    79 Eugenol - -    80 Geraniol - -    81 Hydroxycitronellal - -    82 Isoeugenol - -    83 Cinnamic Aldehyde - -    84 Cinnamic Alcohol  - -    85 Sorbitane Sesquioleate NA NA     Fragrance mix II      86 Citronellol - -    87 Alpha-Hexylcinnamic Aldehyde    - -    88 Citral - -    89 Farnesol - -    90 Coumarin - -      PRESERVATIVES & ANTIMICROBIALS         No Substance 2 days 4 days remarks   91  1,2-Benzisothiazoline-3-One, Sodium Salt - -    92  1,3,5-Gordon (2-Hydroxyethyl) - Hexahydrotriazine (Grotan BK) - -    93 3-Phsfpoftslkoj-7-Nitro-1, 3-Propanediol - -    94  3, 4, 4' - Triclocarban - -    95 4 - Chloro - 3 - Cresol - -    96 4 - Chloro - 4 - Xylenol (PCMX) - -    97 7-Ethylbicyclooxazolidine (Bioban [id number]) - -    98 Benzalkonium Chloride - -    99 Benzyl Alcohol - -    100 Cetalkonium Chloride - -    101 Cetylpyrimidine Chloride  - -    102 Chloroacetamide - -    103 DMDM Hydantoin - -    104 Glutaraldehyde - -    105 Triclosan NA NA    106 Glyoxal Trimeric Dihydrate - -    107 Iodopropynyl Butylcarbamate - -    108 Octylisothiazoline - -    109 Iodoform - -    110 (Nitrobutyl) Morpholine/(Ethylnitro-Trimethylene) Dimorpholine (Bioban P 1487) - -    111 Phenoxyethanol - -    112 Phenyl Salicylate - -    113 Povidone Iodine - -    114 Sodium Benzoate - -    115 Sodium Disulfite - -    116 Sorbic Acid NA NA    117 Thimerosal - -     Parabens      118 Butyl-P-Hydroxybenzoate - -    119 Ethyl-P-Hydroxybenzoate - -    120  Methyl-P-Hydroxybenzoate - -    121 Propyl-P-Hydroxybenzoate - -      RUBBER CHEMICALS         No Substance 2 days 4 days remarks    Carbamate      122 Zinc Bis ( Diethyldithio carbamate ) (ZDEC) - -    123 Zinc Bis (Dibutyldithiocarbamate) - -    124 1,3-Diphenylguanidine (DPG) - -     Thiourea      125 Dibutylthiourea - -    126 Diphenyltiourea - -    127 Thiourea - -     Mercapto chemicals      128 Morpholinyl Mercaptobenzothiazole - -    129 S-Demwmbyyod-1-Benzothiazyl-Sulfenamide - -    130 Dibenzothiazyl Disulfide - -     Thiuram chemicals      131 Dipentamethylenethiuram Disulfide - -    132 Tetraethylthiuram Disulfide (Disulfiram) - -    133 Tetramethylthiuram Disulfide - -    134 Tetramethyl Thiuram Monosulfide (TMTM) - -     4-Phenylenediamine derivatives      135 N-Isopropyl-N'-Phenyl-P-Phenylenediamine (IPPD) - -    136 Aajnrjsc-N-Bitejyteewffazkb (DPPD) - -     Various Rubber Additives      137 Hydroquinone Monobenzylether  - -    138 Hexamethylenetetramine - -    139 4,4'-Dihydroxybiphenyl - -    140 Cyclohexylthiophtalimide - -    141 Ethylenediamine Dihydrochloride - -    142 N-Phenyl-B-Naphthylamine - -    143 Dodecyl Mercaptan - -      HAIRDRESSER Series         No Substance 2 days 4 days remarks   144 P-Phenylenediamin  -  -    145 P-Toluenediamine Sulphate  -  -    146 Ammonium Thioglycolate - -    147 Ammonium Persulfate - -    148 Resorcinol - -    149 3-Aminophenol - -    150 P-Aminophenol - -    151 Glyceryl Monothioglycolate - -    152 Zinc Pyrithione  - -    153 Pyrogallol - -    154 P-Aminodiphenylamine - -    155 Dimethylaminopropylamin (DMAPA) - -      Results of patch tests:                         Interpretation:    - Negative                    A    = Allergic      (+) Erythema    TI   = Toxic/irritant   + E + Infiltration    RaP = Relevance at Present     ++ E/I + Papulovesicle   Rpr  = Relevance Previously     +++ E/I/P + Blister     nR   = No Relevance    [x] Relevant allergic  reaction observed:      Nickel Sulphate Hexahydrate +/++    Bacitracin +     Immediate and delayed type reaction to house dust mites!      Interpretation/ remarks:   Patient seems to have a contact sensitization to the nickel and less to bacitracin. The nickel allergy would fit well into the atopic dermatitis but it probably doesn't have relevance for the dermatitis on the hands and in the face, as well for the bacitracin the patient doesn't really use this.    ==> final Diagnosis:    >> Chronic recurrent eczema on hands and in face as part of atopic dermatitis and with irritant component     Strong immediate and delayed type sensitivity to the house dust mite D. Farinae and D. 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 Pteronyssinus and D. Farinae negatives and total IgE not elevated    ==> Treatment prescribed/Plan:    We previously discussed with the patient IT, but we think it will not be effective in this case because there is as well a delayed-type sensitization to house dust mites and therefore immunotherapy not recommended    Continue to reduce house dust mites in the bedroom and bed as much as possible. Information was previously given. --> House dust mite reduction was started about a month ago as of today.    Continue moisturizing treatment to rebuild the skin barrier like CeraVe for hand and body wash. Continue with Free and Clear shampoo and conditioner.    Continue Eucrisa to be applied to affected areas from Monday through Friday 1-2x daily (worked better than Protopic). Continue mometasone cream to be used twice weekly on Saturday and Sunday.    --> Discussed with the patient the different treatment options of atopic dermatitis. Patient has regular flare-up on the body but particularly on her hands (more irritant dermatitis of this atopic dermatitis). We discuss the option of UV treatment. However, this is a bit  more difficult because of time restriction and her eczema is mostly on the hand but diffusely distributed over the body with very changing clinical appearance. In addition we discussed the use of Otezla particularly because Eucrisa seems to be more helpful than Protopic. There is certainly a good possibility that the Dupixent might work, especially in this patient with a clear atopic dermatitis and the delayed type sensitivity to dust mites.     We prescribe the patient Dupixent to start with 600 mg and a maintenance dose of 300 mg every two weeks. Further followups with Dr. Lundberg for management of this strong atopic dermatitis.       These conclusions are made at the best of one's knowledge and belief based on the provided evidence such as patient's history and allergy test results and they can change over time or can be incomplete because of missing information's.    CC: Dr. Lundberg     Follow-up with the patient's referring provider, Dr. Lundberg. Discuss and followup with him about the Otezla and UV treatment, and if no improvement, dupixent. She will call for an appointment in 5-6 weeks.       Staff Involved:    Scribe Disclosure  I, Patty Mireles, am serving as a scribe to document services personally performed by Dr. Castillo Pace, based on data collection and the provider's statements to me.     I spent a total of 23 minutes face to face with Kaley Magana during today s office visit. Over 50% of this time was spent discussing all the individual test results, correlating them to the clinical relevance, counseling the patient and/or coordinating care. Please see Assessment and Plan for details.

## 2020-01-24 NOTE — NURSING NOTE
Chief Complaint   Patient presents with     Allergies     Completed prednisone (cleared everything up) started Eucrisa and starting to break out again,      Kell Sanchez, KEISHAN

## 2020-01-24 NOTE — PATIENT INSTRUCTIONS
Atopy Screen (Placed 11/25/19 )    No Substance Readings (15 min) Evaluation   POS Histamine 1mg/ml ++    NEG NaCl 0.9% -      No Substance Readings (15 min) Evaluation   1 Alternaria alternata (tenuis)  -    2 Cladosporium herbarum -    3 Aspergillus fumigatus -    4 Penicillium notatum -    5 Dermatophagoides pteronyssinus -    6 Dermatophagoides farinae -    7 Dog epithelium (canis spp) -    8 Cat hair (trinity catus) -    9 Cockroach   (Blatella americana & germanica) -    10 Grass mix midwest   ([place], Orchard, Redtop, Raul) -    11 Cornel grass (sorghum halepense) -    12 Weed mix   (common Cocklebur, Lamb s quarters, rough redroot Pigweed, Dock/Sorrel) -    13 Mug wort (artemisia vulgare) -    14 Ragweed giant/short (ambrosia spp) -    15 English Plantain (plantago lanceolata) -    16 Tree mix 1 (Pecan, Maple BHR, Oak RVW, american [place], black [place]) -    17 Red cedar (juniperus virginia) -    18 Tree mix 2   (white Mathew, river/red Birch, black Purlear, common [place], american Elm) -    19 Box elder/Maple mix (acer spp) -    20 [place]worth shagbark (carya ovata) -       -      Conclusion: Negative atopy screen prick test. No atopic predisposition.    RESULTS & EVALUATION of PATCH TESTS    Patch test readings after     [x] 2 days, [] 3 days [x] 4 days, [] 5 days,    Applied patch tests with results (import here the list of patch tests):  Order documented by: ARACELIS JONES  Order reviewed by: Mimi Montenegro LPN   Physician:   Date/time of application: 11/25/2019  Localization of application: Back >>> no eczema or lesions     STANDARD Series         No Substance 2 days 4 days remarks   1 Jayy Mix [C] - -    2 Colophony - -    3  2-Mercaptobenzothiazole  - -     4 Methylisothiazolinone - -    5 Carba Mix - -    6 Thiuram Mix [A] - -    7 Bisphenol A Epoxy Resin - -    8 P-Sesa-Uvxkcckipup-Formaldehyde Resin - -    9 Mercapto Mix [A] - -    10 Black Rubber Mix- PPD [B] - -    11  Potassium Dichromate  +  -    12 Balsam of [place] (Myroxylon Pereirae Resin) - -    13 Nickel Sulphate Hexahydrate - +/++    14 Mixed Dialkyl Thiourea - -    15 Paraben Mix [B] - -    16 Methyldibromo Glutaronitrile + -    17 Fragrance Mix - -    18 2-Bromo-2-Nitropropane-1,3-Diol (Bronopol) - -    19 Lyral - -    20 Tixocortol-21- Pivalate - -    21 Diazolidiyl Urea (Germall II) - -    22 Methyl Methacrylate - -    23 Cobalt (II) Chloride Hexahydrate NA NA    24 Fragrance Mix II  - -    25 Compositae Mix - -    26 Benzoyl Peroxide - -    27 Bacitracin + +    28 Formaldehyde - -    29 Methylchloroisothiazolinone / Methylisothiazolinone - -    30 Corticosteroid Mix - -    31 Sodium Lauryl Sulfate - -    32 Lanolin Alcohol - -    33 Turpentine - -    34 Cetylstearylalcohol - -    35 Chlorhexidine Dicluconate + -    36 Budenoside - -    37 Imidazolidinyl Urea  - -    38 Ethyl-2 Cyanoacrylate NA NA    39 Quaternium 15 (Dowicil 200) - -    40 Decyl Glucoside - -      EMULSIFIERS & ADDITIVES        No Substance 2 days 4 days remarks   41 Polyethylene Glycol-400 - -    42 Cocamidopropyl Betaine - -    43 Amerchol L101 NA NA    44 Propylene Glycol - -    45 Triethanolamine - -    46 Sorbitane Sesquiolate - -    47 Isopropylmyristate - -    48 Polysorbate 80  - -    49 Amidoamine   (Stearamidopropyl Dimethylamine) - -    50 Oleamidopropyl Dimethylamine - -    51 Lauryl Glucoside - -    52 Coconut Diethanolamide  - -    53 2-Hydroxy-4-Methoxy Benzophenone (Oxybenzone) - -    54 Benzophenone-4 (Sulisobenzon) - -    55 Propolis - -    56 Dexpanthenol - -    57 Carboxymethyl Cellulose Sodium - -    58 Abitol - -    59 Tert-Butylhydroquinone - -    60 Benzyl Salicylate - -     Antioxidant      61 Dodecyl Gallate - -    62 Butylhydroxyanisole (BHA) - -    63 Butylhydroxytoluene (BHT) - -    64 Di-Alpha-Tocopherol (Vit E) - -    65 Propyl Gallate - -      PERFUMES, FLAVORS & PLANTS         No Substance 2 days 4 days remarks   66  Benzyl Salicylate - -    67 Benzyl Cinnamate - -    68 Di-Limonene (Dipentene) NA NA    69 Cananga Odorata (Christopher White) (I) - -    70 Lichen Acid Mix - -    71 Mentha Piperita Oil (Peppermint Oil) - -    72 Sesquiterpenelactone mix - -    73 Tea Tree Oil, Oxidized - -    74 Wood Tar Mix - -    75 Abietic Acid NA NA    76 Lavendula Angustifolia Oil (Lavender Oil) - -    77 Camphor  NA NA     Fragrance Mix I      78 [place]moss Absolute - -    79 Eugenol - -    80 Geraniol - -    81 Hydroxycitronellal - -    82 Isoeugenol - -    83 Cinnamic Aldehyde - -    84 Cinnamic Alcohol  - -    85 Sorbitane Sesquioleate NA NA     Fragrance mix II      86 Citronellol - -    87 Alpha-Hexylcinnamic Aldehyde    - -    88 Citral - -    89 Farnesol - -    90 Coumarin - -      PRESERVATIVES & ANTIMICROBIALS         No Substance 2 days 4 days remarks   91  1,2-Benzisothiazoline-3-One, Sodium Salt - -    92  1,3,5-Gordon (2-Hydroxyethyl) - Hexahydrotriazine (Grotan BK) - -    93 2-Zrzbljvghppuu-6-Nitro-1, 3-Propanediol - -    94  3, 4, 4' - Triclocarban - -    95 4 - Chloro - 3 - Cresol - -    96 4 - Chloro - 4 - Xylenol (PCMX) - -    97 7-Ethylbicyclooxazolidine (Bioban [id number]) - -    98 Benzalkonium Chloride - -    99 Benzyl Alcohol - -    100 Cetalkonium Chloride - -    101 Cetylpyrimidine Chloride  - -    102 Chloroacetamide - -    103 DMDM Hydantoin - -    104 Glutaraldehyde - -    105 Triclosan NA NA    106 Glyoxal Trimeric Dihydrate - -    107 Iodopropynyl Butylcarbamate - -    108 Octylisothiazoline - -    109 Iodoform - -    110 (Nitrobutyl) Morpholine/(Ethylnitro-Trimethylene) Dimorpholine (Bioban P 1487) - -    111 Phenoxyethanol - -    112 Phenyl Salicylate - -    113 Povidone Iodine - -    114 Sodium Benzoate - -    115 Sodium Disulfite - -    116 Sorbic Acid NA NA    117 Thimerosal - -     Parabens      118 Butyl-P-Hydroxybenzoate - -    119 Ethyl-P-Hydroxybenzoate - -    120 Methyl-P-Hydroxybenzoate - -    121  Propyl-P-Hydroxybenzoate - -      RUBBER CHEMICALS         No Substance 2 days 4 days remarks    Carbamate      122 Zinc Bis ( Diethyldithio carbamate ) (ZDEC) - -    123 Zinc Bis (Dibutyldithiocarbamate) - -    124 1,3-Diphenylguanidine (DPG) - -     Thiourea      125 Dibutylthiourea - -    126 Diphenyltiourea - -    127 Thiourea - -     Mercapto chemicals      128 Morpholinyl Mercaptobenzothiazole - -    129 B-Xnjvhwkwoh-6-Benzothiazyl-Sulfenamide - -    130 Dibenzothiazyl Disulfide - -     Thiuram chemicals      131 Dipentamethylenethiuram Disulfide - -    132 Tetraethylthiuram Disulfide (Disulfiram) - -    133 Tetramethylthiuram Disulfide - -    134 Tetramethyl Thiuram Monosulfide (TMTM) - -     4-Phenylenediamine derivatives      135 N-Isopropyl-N'-Phenyl-P-Phenylenediamine (IPPD) - -    136 Itatpmrs-V-Mtvujszqdusozaxb (DPPD) - -     Various Rubber Additives      137 Hydroquinone Monobenzylether  - -    138 Hexamethylenetetramine - -    139 4,4'-Dihydroxybiphenyl - -    140 Cyclohexylthiophtalimide - -    141 Ethylenediamine Dihydrochloride - -    142 N-Phenyl-B-Naphthylamine - -    143 Dodecyl Mercaptan - -      HAIRDRESSER Series         No Substance 2 days 4 days remarks   144 P-Phenylenediamin  -  -    145 P-Toluenediamine Sulphate  -  -    146 Ammonium Thioglycolate - -    147 Ammonium Persulfate - -    148 Resorcinol - -    149 3-Aminophenol - -    150 P-Aminophenol - -    151 Glyceryl Monothioglycolate - -    152 Zinc Pyrithione  - -    153 Pyrogallol - -    154 P-Aminodiphenylamine - -    155 Dimethylaminopropylamin (DMAPA) - -      Results of patch tests:                         Interpretation:    - Negative                    A    = Allergic      (+) Erythema    TI   = Toxic/irritant   + E + Infiltration    RaP = Relevance at Present     ++ E/I + Papulovesicle   Rpr  = Relevance Previously     +++ E/I/P + Blister     nR   = No Relevance    [x] No relevant allergic reaction observed:  Standard  series:  Nickel Sulphate Hexahydrate +/++  Bacitracin +       Intraderrmal Testing (Placed November 29, 2019)    No Substance Conc.  Readings (15min) Evaluation   1 NaCl  0.9% -    2 Histamine  0.1mg / ml ++ 13mmP/20mmE   4 Standard Dust Mite - D. Farinae 1:10 +/++ 7mmP/10mmE   5 Standard Dust Mite - D. Pteronyssinus 1:10 + 8mmP/8mmE   10 Aspergillus fumigatus  1:10 - 3mmP/3mmE   11 Penicillium notatum 1:10 -      Conclusion:   Patient has a slight reaction to the house dust mites which would show that she has an atopic predisposition and this could explain as well that the hand eczema is not due to a contact allergy but as a minimal of atopic dermatitis.  Delayed reactions --> 3 days after, D. Farinae has an infiltration of about 15mm and 20mm erythema and D. Pteronyssinus had infiltration of about 13mm and 15mm erythema    Interpretation/ remarks:   Patient seems to have a contact sensitization to the nickel and less to bacitracin. The nickel allergy would fit well into the atopic dermatitis but it probably doesn't have relevance for the dermatitis on the hands and in the face, as well for the bacitracin the patient doesn't really use this.    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 Pteronyssinus and D. Farinae negatives!    Acute flare ups on right fingers DDx irritant, contact allergy or aggravation by house dust mite    --> Very strong immediate and delayed type sensitivity to the house dust mite D. Farinae and D. 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 Information was previously given. --> House dust mite reduction was started about a month ago as of today.    Continue moisturizing treatment to rebuild the skin barrier like CeraVe for hand and body wash. Continue with Free and Clear shampoo and conditioner.    Continue Eucrisa to be applied to affected areas from Monday through Friday 1-2x daily (worked better than Protopic). Continue mometasone cream to be used twice weekly on Saturday and Sunday.    --> Discussed with the patient the different treatment options of atopic dermatitis. Patient has regular flare-up on the body but particularly on her hands (more irritant dermatitis of this atopic dermatitis). We discuss the option of UV treatment. However, this is a bit more difficult because of time restriction and her eczema is mostly on the hand but diffusely distributed over the body with very changing clinical appearance. In addition we discussed the use of Otezla particularly because Eucrisa seems to be more helpful than Protopic. There is certainly a good possibility that the Dupixent might work, especially in this patient with a clear atopic dermatitis and the delayed type sensitivity to dust mites.     We prescribe the patient Dupixent to start with 600 mg and a maintenance dose of 300 mg every two weeks. Further followups with Dr. Lundberg for management of this strong atopic dermatitis.       These conclusions are made at the best of one's knowledge and belief based on the provided evidence such as patient's history and allergy test results and they can change over time or can be incomplete because of missing information's.    Follow-up with the patient's referring provider, Dr. Lundberg. Discuss and followup with him about the Otezla and UV treatment, and if no improvement, dupixent. She will call for an appointment in 5-6 weeks.

## 2020-01-24 NOTE — LETTER
1/24/2020         RE: Kaley Magana  [address] [place] [address]        Dear Colleague,    Thank you for referring your patient, Kaley Magana, to the [facility] ALLERGY. Please see a copy of my visit note below.    [facility] Allergy Note    Allergy Clinic  [facility]  [address] [place] [id number]    Allergy Problem List:    Specialty Problems     None        CC:   Allergies (Completed prednisone (cleared everything up) started Eucrisa and starting to break out again, )    Encounter Date: Jan 24, 2020    History of Present Illness:  Ms. Kaley Magana is a 64 year old female who presents as a referral from a dermatologist Tamika. She has had eczema on her hands, abdomen, arms, and around her mouth for the last 2 years. Was started on lisinopril in 2017 to control her blood pressure and she developed leg edema as well as spots on her legs.   A biopsy was taken which revealed venous stasis dermatitis and the lisinopril was discontinued. Currently uses metoprolol and wears compression stockings. While the venous stasis resolved, she developed eczema on other parts of her body. Treats until eczema is resolved but develops eczema again a few days later. She has been using betamethasone 0.05% ointment 2-4 times per day for 3-4 times per week for over a year. She currently has eczema on her left abdomen, left upper arm, right elbow, hands, and under her left nose.   Has eliminated all fragrances from her hygiene care and clothing care to see if there would be any improvement but did not notice any changes. Has tried wearing cotton gloves with lotion with no improvement. She reports that it seems using lotion causes more irritation than improvement. Has tried using Vaseline alone and with betamethasone with no improvement. Has tried continuing to apply betamethasone to areas of eczema for a few days after resolution without  any change in the amount of time for the eczema to reappear. Reports that she has developed blisters with yellow secretions which she took a picture of on her phone. Denies having asthma or allergies.   Has rhinoconjunctivitis throughout the fall which started a few years ago.   Has noticed hand irritation after wearing rubber gloves. Stress causes her eczema to worsen. Dyes hair. Does not pain nails. Daughter has severe allergies.   Patient brought in a list of current medications, supplements, and lotions as of November 2019: metoprolol 25 mg (2+ years), simvastatin 20mg (4+ years, synthroid 25 mcg (3 months), Maxalt 10mg (5+ years prn), betamethasone 0.05% ointment (2+ years, may just be 1+ years), hydrocortisone 2.5% (1+ years), [employer] eye vitamin, multivitamin, biotin, calcium/vitamin D, Blink eye drops, antacids for acid reflux, CeraVe, Vanicream, and Sarna lotion.    Hx since 11/27/19:  The patient comes in today for patch testing day 5. Reports that last night she developed a lesion on the L thumb that she would like examined today. The patient is otherwise feeling well overall. No other allergy concerns at this time.     Hx since 11/29/19:  The patient comes in today for follow up of delayed-reaction to her intradermal tests. Of note, there was significant infiltration and increased erythema over the test field sites of D. Farinae and D. Pteronyssinus on her R upper extremity.  She shares that this occurred about 3 days after injection.     Hx since 12/2/19:  The patient returns in clinic today for a 1 month followup. She last saw us in the allergy clinic on 12/2/19 for a delayed-reaction to dust mites. At the time she was to reduce house dust mites as much as possible in the bedroom. She was prescribed Protopic 0.1% ointment to be applied BID on the upper extremities, hands and face. She was to continue with CeraVe for hand and body wash.   Today she reports that the eczema of the hands have worsened  about a week ago despite using the Protopic in the interim. She used the Protopic twice daily for about 2-3 weeks. She stopped using it on Tuesday of last week. Over the holidays she had her children wash the dishes due to hand symptoms; denies that there was any stress exacerbating symptoms. Did not wear any nail polish or artificial nails. Did not use glue.  Other affected areas include the R elbow and L proximal upper extremity.    She shares that she also uses a mousse with spray for her hair, followed by brushing and drying of the hair.     Also reports that in the last couple of weeks she began having dry skin of the hands.     She is using betamethasone; last applied this last Tuesday.     She is working at current.    She is otherwise feeling well overall today. No other skin or allergy concerns at this time.     Hx since 12/27/19:  The patient comes in today for a 3-week followup. She was last seen in clinic on 12/27/19 for a followup. She was to continue reducing house dust mites in the bedroom and bed, continue CeraVe, Free and Clear shampoo and conditioner. She was prescribed prednisone taper, Eucrisa, and mometasone cream.    She had stopped the Protopic since it didn't work. She used Eucrisa instead, which worked for the first couple of weeks with prednisone. However, she began breaking out, notably along the medial upper extremities.  Reports that there is now swelling at joints of the fingers and she has spots on the arms, stomach, and legs.    The patient is otherwise feeling well overall. There are no other skin or allergy concerns at this time.       Past Medical History:   There is no problem list on file for this patient.    No past medical history on file.    Allergy History:     Allergies   Allergen Reactions     Amlodipine Swelling     Doxycycline Rash     Sulfamethoxazole-Trimethoprim Rash       Social History:  The patient works at the  of a physical therapy office. Patient has  the following hobbies or non-occupational exposure: reads, enjoys being on her porch particularly when it is summer.     reports that she has never smoked. She has never used smokeless tobacco.      Family History:  No family history on file.    Medications:  Current Outpatient Medications   Medication Sig Dispense Refill     betamethasone dipropionate (DIPROSONE) 0.05 % external ointment        crisaborole (EUCRISA) 2 % ointment Once to twice a day, Monday through Friday 60 g 1     Elastic Bandages & Supports (FUTURO RESTORING DRESS SOCKS) MISC Use daily for leg swelling.       hydrocortisone 2.5 % ointment        levothyroxine (SYNTHROID/LEVOTHROID) 25 MCG tablet Take 25 mcg by mouth       metoprolol succinate ER (TOPROL-XL) 25 MG 24 hr tablet Take 25 mg by mouth       mometasone (ELOCON) 0.1 % external cream Use on your hands once a day, Saturday and Sunday 15 g 0     pimecrolimus (ELIDEL) 1 % external cream Apply topically 2 times daily 30 g 3     predniSONE (DELTASONE) 5 MG tablet Start with 6 tablets/30 mg and decrease by 1 tablet/5 mg every day until all tablets are gone 21 tablet 0     rizatriptan (MAXALT) 10 MG tablet        simvastatin (ZOCOR) 20 MG tablet        tacrolimus (PROTOPIC) 0.1 % external ointment Apply topically 2 times daily On hands and arms and face    Use for hair wash Free&Clear Shampoo and Conditioner 60 g 3       Review of Systems:  -As per HPI  -Constitutional: The patient denies fatigue, fevers, chills, unintended weight loss, and night sweats.  -HEENT: Patient denies nonhealing oral sores.  -Skin: As above in HPI. No additional skin concerns.    Physical exam:  Vitals: There were no vitals taken for this visit.  GEN: This is a well developed, well-nourished female in no acute distress, in a pleasant mood.    SKIN: Focused examination of the hands were performed.  - Acute eczema on dorsal part of R hand on distal phalynx  -No other lesions of concern on areas examined     Allergy  Tests:    Order for PATCH TESTS    [] Outpatient  [] Inpatient: [facility]..../ Bed ....      Skin Atopy (atopic dermatitis) [] Yes   [x] No  Rhinitis/Sinusitis:   [x] Yes   [] No Fall  Allergic Asthma:   [] Yes   [x] No  Food Allergy:   [] Yes   [x] No  Leg ulcers:   [] Yes   [x] No  Hand eczema:   [x] Yes   [] No   Leading hand:   [x] R   [] L       [] Ambidextrous                        Reason for tests (suspected allergy): acute to subacute dermatitis on back of hands, extensor part of arms and perioral  Known previous allergies: none    Standardized panels  [x] Standard panel (40 tests)  [x] Preservatives & Antimicrobials (31 tests)  [x] Emulsifiers & Additives (25 tests)   [x] Perfumes/Flavours & Plants (25 tests)  [x] Hairdresser panel (12 tests)  [x] Rubber Chemicals (22 tests)  [] Plastics (26 tests)  [] Colorants/Dyes/Food additives (20 tests)  [] Metals (implants/dental) (24 tests)  [] Local anaesthetics/NSAIDs (13 tests)  [] Antibiotics & Antimycotics (14 tests)   [] Corticosteroids (15 tests)   [] Photopatch test (62 tests)   [] others: ...      [] Patient's own products: ...    DO NOT test if chemical or biological identity is unknown!     always ask from patient the product information and safety sheets (MSDS)   [x] Atopy screen prick test (Atopic predisposition): ...    [] Patient needs consultation with Allergy team (changes of tests may apply)  [] Tests discussed with Allergy team (can have direct appointment for patch tests)    Atopy Screen (Placed 11/25/19 )    No Substance Readings (15 min) Evaluation   POS Histamine 1mg/ml ++    NEG NaCl 0.9% -      No Substance Readings (15 min) Evaluation   1 Alternaria alternata (tenuis)  -    2 Cladosporium herbarum -    3 Aspergillus fumigatus -    4 Penicillium notatum -    5 Dermatophagoides pteronyssinus -    6 Dermatophagoides farinae -    7 Dog epithelium (canis spp) -    8 Cat hair (trinity catus) -    9 Cockroach   (Blatella americana & germanica) -    10  Grass mix midwest   (Nury, Orchard, Redtop, Raul) -    11 Cornel grass (sorghum halepense) -    12 Weed mix   (common Cocklebur, Lamb s quarters, rough redroot Pigweed, Dock/Sorrel) -    13 Mug wort (artemisia vulgare) -    14 Ragweed giant/short (ambrosia spp) -    15 English Plantain (plantago lanceolata) -    16 Tree mix 1 (Pecan, Maple BHR, Oak RVW, american Shawnee, black Vienna) -    17 Red cedar (juniperus virginia) -    18 Tree mix 2   (white Mathew, river/red Birch, black Dresher, common [place], american Elm) -    19 Box elder/Maple mix (acer spp) -    20 Burlington shagbark (carya ovata) -       -      Conclusion: Negative atopy screen prick test. No atopic predisposition.      Intraderrmal Testing (Placed November 29, 2019)    No Substance Conc.  Readings (15min) Evaluation   1 NaCl  0.9% -    2 Histamine  0.1mg / ml ++ 13mmP/20mmE   4 Standard Dust Mite - D. Farinae 1:10 +/++ 7mmP/10mmE   5 Standard Dust Mite - D. Pteronyssinus 1:10 + 8mmP/8mmE   10 Aspergillus fumigatus  1:10 - 3mmP/3mmE   11 Penicillium notatum 1:10 -      Conclusion:   Patient has a slight reaction to the house dust mites which would show that she has an atopic predisposition and this could explain as well that the hand eczema is not due to a contact allergy but as a minimal of atopic dermatitis.  Delayed reactions --> 3 days after, D. Farinae has an infiltration of about 15mm and 20mm erythema and D. Pteronyssinus had infiltration of about 13mm and 15mm erythema    RESULTS & EVALUATION of PATCH TESTS    Patch test readings after     [x] 2 days, [] 3 days [x] 4 days, [] 5 days,    Applied patch tests with results (import here the list of patch tests):  Order documented by: ARACELIS JONES  Order reviewed by: Mimi Montenegro LPN   Physician:   Date/time of application: 11/25/2019  Localization of application: Back >>> no eczema or lesions     STANDARD Series         No Substance 2 days 4 days remarks   1 Jayy Mix  [C] - -    2 Colophony - -    3  2-Mercaptobenzothiazole  - -     4 Methylisothiazolinone - -    5 Carba Mix - -    6 Thiuram Mix [A] - -    7 Bisphenol A Epoxy Resin - -    8 P-Zzya-Bxxzpyommiw-Formaldehyde Resin - -    9 Mercapto Mix [A] - -    10 Black Rubber Mix- PPD [B] - -    11 Potassium Dichromate  +  -    12 Balsam of [place] (Myroxylon Pereirae Resin) - -    13 Nickel Sulphate Hexahydrate - +/++    14 Mixed Dialkyl Thiourea - -    15 Paraben Mix [B] - -    16 Methyldibromo Glutaronitrile + -    17 Fragrance Mix - -    18 2-Bromo-2-Nitropropane-1,3-Diol (Bronopol) - -    19 Lyral - -    20 Tixocortol-21- Pivalate - -    21 Diazolidiyl Urea (Germall II) - -    22 Methyl Methacrylate - -    23 Cobalt (II) Chloride Hexahydrate NA NA    24 Fragrance Mix II  - -    25 Compositae Mix - -    26 Benzoyl Peroxide - -    27 Bacitracin + +    28 Formaldehyde - -    29 Methylchloroisothiazolinone / Methylisothiazolinone - -    30 Corticosteroid Mix - -    31 Sodium Lauryl Sulfate - -    32 Lanolin Alcohol - -    33 Turpentine - -    34 Cetylstearylalcohol - -    35 Chlorhexidine Dicluconate + -    36 Budenoside - -    37 Imidazolidinyl Urea  - -    38 Ethyl-2 Cyanoacrylate NA NA    39 Quaternium 15 (Dowicil 200) - -    40 Decyl Glucoside - -      EMULSIFIERS & ADDITIVES        No Substance 2 days 4 days remarks   41 Polyethylene Glycol-400 - -    42 Cocamidopropyl Betaine - -    43 Amerchol L101 NA NA    44 Propylene Glycol - -    45 Triethanolamine - -    46 Sorbitane Sesquiolate - -    47 Isopropylmyristate - -    48 Polysorbate 80  - -    49 Amidoamine   (Stearamidopropyl Dimethylamine) - -    50 Oleamidopropyl Dimethylamine - -    51 Lauryl Glucoside - -    52 Coconut Diethanolamide  - -    53 2-Hydroxy-4-Methoxy Benzophenone (Oxybenzone) - -    54 Benzophenone-4 (Sulisobenzon) - -    55 Propolis - -    56 Dexpanthenol - -    57 Carboxymethyl Cellulose Sodium - -    58 Abitol - -    59 Tert-Butylhydroquinone - -    60  Benzyl Salicylate - -     Antioxidant      61 Dodecyl Gallate - -    62 Butylhydroxyanisole (BHA) - -    63 Butylhydroxytoluene (BHT) - -    64 Di-Alpha-Tocopherol (Vit E) - -    65 Propyl Gallate - -      PERFUMES, FLAVORS & PLANTS         No Substance 2 days 4 days remarks   66 Benzyl Salicylate - -    67 Benzyl Cinnamate - -    68 Di-Limonene (Dipentene) NA NA    69 Cananga Odorata (Christopher White) (I) - -    70 Lichen Acid Mix - -    71 Mentha Piperita Oil (Peppermint Oil) - -    72 Sesquiterpenelactone mix - -    73 Tea Tree Oil, Oxidized - -    74 Wood Tar Mix - -    75 Abietic Acid NA NA    76 Lavendula Angustifolia Oil (Lavender Oil) - -    77 Camphor  NA NA     Fragrance Mix I      78 Oakmoss Absolute - -    79 Eugenol - -    80 Geraniol - -    81 Hydroxycitronellal - -    82 Isoeugenol - -    83 Cinnamic Aldehyde - -    84 Cinnamic Alcohol  - -    85 Sorbitane Sesquioleate NA NA     Fragrance mix II      86 Citronellol - -    87 Alpha-Hexylcinnamic Aldehyde    - -    88 Citral - -    89 Farnesol - -    90 Coumarin - -      PRESERVATIVES & ANTIMICROBIALS         No Substance 2 days 4 days remarks   91  1,2-Benzisothiazoline-3-One, Sodium Salt - -    92  1,3,5-Gordon (2-Hydroxyethyl) - Hexahydrotriazine (Grotan BK) - -    93 7-Rkkbcwlgxitxx-0-Nitro-1, 3-Propanediol - -    94  3, 4, 4' - Triclocarban - -    95 4 - Chloro - 3 - Cresol - -    96 4 - Chloro - 4 - Xylenol (PCMX) - -    97 7-Ethylbicyclooxazolidine (Bioban [id number]) - -    98 Benzalkonium Chloride - -    99 Benzyl Alcohol - -    100 Cetalkonium Chloride - -    101 Cetylpyrimidine Chloride  - -    102 Chloroacetamide - -    103 DMDM Hydantoin - -    104 Glutaraldehyde - -    105 Triclosan NA NA    106 Glyoxal Trimeric Dihydrate - -    107 Iodopropynyl Butylcarbamate - -    108 Octylisothiazoline - -    109 Iodoform - -    110 (Nitrobutyl) Morpholine/(Ethylnitro-Trimethylene) Dimorpholine ([facility] P 1487) - -    111 Phenoxyethanol - -    112 Phenyl  Salicylate - -    113 Povidone Iodine - -    114 Sodium Benzoate - -    115 Sodium Disulfite - -    116 Sorbic Acid NA NA    117 Thimerosal - -     Parabens      118 Butyl-P-Hydroxybenzoate - -    119 Ethyl-P-Hydroxybenzoate - -    120 Methyl-P-Hydroxybenzoate - -    121 Propyl-P-Hydroxybenzoate - -      RUBBER CHEMICALS         No Substance 2 days 4 days remarks    Carbamate      122 Zinc Bis ( Diethyldithio carbamate ) (ZDEC) - -    123 Zinc Bis (Dibutyldithiocarbamate) - -    124 1,3-Diphenylguanidine (DPG) - -     Thiourea      125 Dibutylthiourea - -    126 Diphenyltiourea - -    127 Thiourea - -     Mercapto chemicals      128 Morpholinyl Mercaptobenzothiazole - -    129 N-Xmeqekrhfa-3-Benzothiazyl-Sulfenamide - -    130 Dibenzothiazyl Disulfide - -     Thiuram chemicals      131 Dipentamethylenethiuram Disulfide - -    132 Tetraethylthiuram Disulfide (Disulfiram) - -    133 Tetramethylthiuram Disulfide - -    134 Tetramethyl Thiuram Monosulfide (TMTM) - -     4-Phenylenediamine derivatives      135 N-Isopropyl-N'-Phenyl-P-Phenylenediamine (IPPD) - -    136 Aczzqxld-U-Fkkqbfdgozbnmizi (DPPD) - -     Various Rubber Additives      137 Hydroquinone Monobenzylether  - -    138 Hexamethylenetetramine - -    139 4,4'-Dihydroxybiphenyl - -    140 Cyclohexylthiophtalimide - -    141 Ethylenediamine Dihydrochloride - -    142 N-Phenyl-B-Naphthylamine - -    143 Dodecyl Mercaptan - -      HAIRDRESSER Series         No Substance 2 days 4 days remarks   144 P-Phenylenediamin  -  -    145 P-Toluenediamine Sulphate  -  -    146 Ammonium Thioglycolate - -    147 Ammonium Persulfate - -    148 Resorcinol - -    149 3-Aminophenol - -    150 P-Aminophenol - -    151 Glyceryl Monothioglycolate - -    152 Zinc Pyrithione  - -    153 Pyrogallol - -    154 P-Aminodiphenylamine - -    155 Dimethylaminopropylamin (DMAPA) - -      Results of patch tests:                         Interpretation:    - Negative                    A    =  Allergic      (+) Erythema    TI   = Toxic/irritant   + E + Infiltration    RaP = Relevance at Present     ++ E/I + Papulovesicle   Rpr  = Relevance Previously     +++ E/I/P + Blister     nR   = No Relevance    [x] Relevant allergic reaction observed:      Nickel Sulphate Hexahydrate +/++    Bacitracin +     Immediate and delayed type reaction to house dust mites!      Interpretation/ remarks:   Patient seems to have a contact sensitization to the nickel and less to bacitracin. The nickel allergy would fit well into the atopic dermatitis but it probably doesn't have relevance for the dermatitis on the hands and in the face, as well for the bacitracin the patient doesn't really use this.    ==> final Diagnosis:    >> Chronic recurrent eczema on hands and in face as part of atopic dermatitis and with irritant component     Strong immediate and delayed type sensitivity to the house dust mite D. Farinae and D. 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 Pteronyssinus and D. Farinae negatives and total IgE not elevated    ==> Treatment prescribed/Plan:    We previously discussed with the patient IT, but we think it will not be effective in this case because there is as well a delayed-type sensitization to house dust mites and therefore immunotherapy not recommended    Continue to reduce house dust mites in the bedroom and bed as much as possible. Information was previously given. --> House dust mite reduction was started about a month ago as of today.    Continue moisturizing treatment to rebuild the skin barrier like CeraVe for hand and body wash. Continue with Free and Clear shampoo and conditioner.    Continue Eucrisa to be applied to affected areas from Monday through Friday 1-2x daily (worked better than Protopic). Continue mometasone cream to be used twice weekly on Saturday and Sunday.    --> Discussed with the patient the  different treatment options of atopic dermatitis. Patient has regular flare-up on the body but particularly on her hands (more irritant dermatitis of this atopic dermatitis). We discuss the option of UV treatment. However, this is a bit more difficult because of time restriction and her eczema is mostly on the hand but diffusely distributed over the body with very changing clinical appearance. In addition we discussed the use of Otezla particularly because Eucrisa seems to be more helpful than Protopic. There is certainly a good possibility that the Dupixent might work, especially in this patient with a clear atopic dermatitis and the delayed type sensitivity to dust mites.     We prescribe the patient Dupixent to start with 600 mg and a maintenance dose of 300 mg every two weeks. Further followups with Dr. Lundberg for management of this strong atopic dermatitis.       These conclusions are made at the best of one's knowledge and belief based on the provided evidence such as patient's history and allergy test results and they can change over time or can be incomplete because of missing information's.    CC: Dr. Lundberg     Follow-up with the patient's referring provider, Dr. Lundberg. Discuss and followup with him about the Otezla and UV treatment, and if no improvement, dupixent. She will call for an appointment in 5-6 weeks.       Staff Involved:    Scribe Disclosure  I, Patty Mireles, am serving as a scribe to document services personally performed by Dr. Castillo Pace, based on data collection and the provider's statements to me.     I spent a total of 23 minutes face to face with Kaley Magana during today s office visit. Over 50% of this time was spent discussing all the individual test results, correlating them to the clinical relevance, counseling the patient and/or coordinating care. Please see Assessment and Plan for details.              Again, thank you for allowing me to participate in the care of your  patient.        Sincerely,        Castillo Pace MD

## 2020-01-27 ENCOUNTER — TELEPHONE (OUTPATIENT)
Dept: ALLERGY | Facility: CLINIC | Age: 65
End: 2020-01-27

## 2020-01-27 DIAGNOSIS — L20.81 ATOPIC NEURODERMATITIS: ICD-10-CM

## 2020-01-27 NOTE — TELEPHONE ENCOUNTER
Health Call Center    Phone Message    May a detailed message be left on voicemail: yes    Reason for Call: Medication Question or concern regarding medication   Prescription Clarification  Name of Medication: Dupilumab (DUPIXENT) 300 MG/2ML syringe  Prescribing Provider: Dr. Garduno   Pharmacy: [facility]   What on the order needs clarification? Sharon calling to report that she has decided to not take the medication due to the cost and potential side effects associated with it.  Please call her with any questions or concerns          Action Taken: Message routed to:  [facility] ([facility]

## 2020-01-29 NOTE — TELEPHONE ENCOUNTER
This was not routed to me but I did see the orders in, and was going to submit PA. Pt does not have Medicare right now, so she wouldn't have much of a cost (there is a copay card available). It looks like she has tried lots of topicals and would be a good candidate for an approval, and then eligible for the copay card. If her primary concern is cost, then please let her know this information.     I LM for her to let her know I could discuss the cost piece with her but as far as the side effect piece, a nurse or MD would be better addressing that.

## 2020-01-31 NOTE — TELEPHONE ENCOUNTER
Castillo Pace MD  You; Keira Agustin 17 hours ago (9:12 PM)      Side effects of Dupixent are not very common and much less problematic than with immunosuppressive medications such as cyclosporine. It can sometimes induce inflammation of eyes or some joint pain. But they are really rare.    Routing comment       Keira Agustin  You; Castillo Pace MD 2 days ago      Pt still needs side effect piece addressed. Doesn't sound like she is going to take it because of cost and side effect. Cost shouldn't be an issue, she has commercial insurancd and eligible for copay card (if insurance approved) Please alert me if I need to submit PA on these or deactivate the Rxs on her profile if she will not be starting.           LVM for patient to call back regarding above.

## 2020-02-03 NOTE — TELEPHONE ENCOUNTER
Prior Authorization Approval    Authorization Effective Date: 2/3/2020  Authorization Expiration Date: 6/2/2020  Medication: Dupilumab (DUPIXENT) 300 MG/2ML syringe  Approved Dose/Quantity: 6/28ds  Reference #: [id number]   Insurance Company: [employer] - Phone [phone] Fax [phone]  Expected CoPay: $5     CoPay Card Available: Yes    Foundation Assistance Needed:    Which Pharmacy is filling the prescription (Not needed for infusion/clinic administered): [facility] - [address]  Pharmacy Notified: Yes  Patient Notified: Yes    WE NEED NEW RXS SENT, THE ONES IN MY QUEUE HAVE AN END DATE AND THIS WILL NOT RELEASE. PLEASE REMOVE END DATE AND MAKE SURE DIRECTIONS ARE CLEAR SINCE THIS IS GOING TO [facility] AND THEY LIKE TO MAKE IT VERY DIFFICULT.

## 2020-02-03 NOTE — TELEPHONE ENCOUNTER
Pt called me back and I told her my piece about the cost (copay card is available if this gets approved by her insurance) and I also read off Dr. Pace's note to her regarding her concern with the side effects. She states she would like to move forward with the medication. I let her know I will submit the PA to her insurance and call her with updates.       PA Initiation    Medication: Dupilumab (DUPIXENT) 300 MG/2ML syringe  Insurance Company: [employer] - Phone [phone] Fax [phone]  Pharmacy Filling the Rx: [address]  Filling Pharmacy Phone:    Filling Pharmacy Fax:    Start Date: 2/3/2020

## 2020-02-10 ENCOUNTER — TELEPHONE (OUTPATIENT)
Dept: DERMATOLOGY | Facility: CLINIC | Age: 65
End: 2020-02-10

## 2020-02-10 NOTE — TELEPHONE ENCOUNTER
[facility] Call Center    Phone Message    May a detailed message be left on voicemail: yes     Reason for Call: Medication Question or concern regarding medication   Prescription Clarification  Name of Medication: Dupilumab (DUPIXENT) 300 MG/2ML syringe    Prescribing Provider: Mimi Montenegro LPN   Pharmacy: [address]   What on the order needs clarification? Pharmacy needs clarification about dosing, quantity, and directions.  Please contact [facility]  [phone]  opt #1  opt # 6 with any questions.          Action Taken: Message routed to:  [facility] ([facility]): allergy    Travel Screening: Not Applicable

## 2020-02-11 NOTE — TELEPHONE ENCOUNTER
"Per last office note \" We prescribe the patient Dupixent to start with 600 mg and a maintenance dose of 300 mg every two weeks. Further followups with Dr. Lundberg for management of this strong atopic dermatitis. \"     Loading dose and maintained dose clarification given to pharmacy regarding this. Nothing additional needed at this time.   "

## 2020-02-25 DIAGNOSIS — L20.81 ATOPIC NEURODERMATITIS: ICD-10-CM

## 2020-02-27 RX ORDER — DUPILUMAB 300 MG/2ML
INJECTION, SOLUTION SUBCUTANEOUS
Qty: 4 ML | Refills: 26 | Status: SHIPPED | OUTPATIENT
Start: 2020-02-27 | End: 2020-03-04

## 2020-02-29 DIAGNOSIS — L20.81 ATOPIC NEURODERMATITIS: ICD-10-CM

## 2020-03-04 RX ORDER — DUPILUMAB 300 MG/2ML
INJECTION, SOLUTION SUBCUTANEOUS
Qty: 4 ML | Refills: 26 | Status: SHIPPED | OUTPATIENT
Start: 2020-03-04 | End: 2020-08-12

## 2020-03-11 ENCOUNTER — HEALTH MAINTENANCE LETTER (OUTPATIENT)
Age: 65
End: 2020-03-11

## 2020-03-29 ENCOUNTER — MYC MEDICAL ADVICE (OUTPATIENT)
Dept: ALLERGY | Facility: CLINIC | Age: 65
End: 2020-03-29

## 2020-03-30 NOTE — TELEPHONE ENCOUNTER
"Tele Nurse Call for RETURN patients seen within the last 3 years:    The patient was contacted by phone and we reviewed, \"Due to the coronavirus pandemic, we are calling to review your visit and offer you a tele visit where you send in photos via [employer]. These photos will be seen by an MD or HANNA. This will be billed to you and your insurance.\"  The patient was also told that \"a tele visit is not as thorough as an in-person visit and that the quality of the photograph sent may not be of the same quality as that taken by the allergy clinic, but the patient would like to proceed with an tele because of National Emergency Regarding Coronavirus disease (COVID 19) Outbreak.\"     The patient understood that they may receive a call from the clinic to review additional history, may still be instructed to come to clinic even after photo review and be billed for both visits with an MD. They were told that a photo assessment does not replace an in person exam. The patient understood that tele appointment is not for urgent issues and would require up to 72 hours for review.     The patient chose to:  The patient accepted a phone visit with no photos to be received. They understood they would receive a bill for this visit. They understood that even with a phone visit, the clinician may require a photo, video or in person visit for treatment.                                                                                                                                                                                                                   Pharmacy preference was updated.      "

## 2020-03-31 ENCOUNTER — VIRTUAL VISIT (OUTPATIENT)
Dept: ALLERGY | Facility: CLINIC | Age: 65
End: 2020-03-31
Payer: COMMERCIAL

## 2020-03-31 DIAGNOSIS — L30.9 ECZEMA, UNSPECIFIED TYPE: ICD-10-CM

## 2020-03-31 DIAGNOSIS — L20.89 OTHER ATOPIC DERMATITIS: ICD-10-CM

## 2020-03-31 RX ORDER — MOMETASONE FUROATE 1 MG/G
CREAM TOPICAL
Qty: 45 G | Refills: 3 | Status: SHIPPED | OUTPATIENT
Start: 2020-03-31 | End: 2020-08-07

## 2020-03-31 NOTE — PROGRESS NOTES
[facility]- Allergy Telephone Consult Note    Allergy Clinic  [facility]  [address] [place] [id number]    Allergy Problem List:    Specialty Problems     None        CC:   Allergy Recheck (Kaley is here for an allergy follow up post dupixent start (feb, 3 injections total) )    Encounter Date: Mar 31, 2020    History of Present Illness:  Ms. Kaley Magana is a 64 year old female who presents as a referral from a dermatologist Tamika. She has had eczema on her hands, abdomen, arms, and around her mouth for the last 2 years. Was started on lisinopril in 2017 to control her blood pressure and she developed leg edema as well as spots on her legs.   A biopsy was taken which revealed venous stasis dermatitis and the lisinopril was discontinued. Currently uses metoprolol and wears compression stockings. While the venous stasis resolved, she developed eczema on other parts of her body. Treats until eczema is resolved but develops eczema again a few days later. She has been using betamethasone 0.05% ointment 2-4 times per day for 3-4 times per week for over a year. She currently has eczema on her left abdomen, left upper arm, right elbow, hands, and under her left nose.   Has eliminated all fragrances from her hygiene care and clothing care to see if there would be any improvement but did not notice any changes. Has tried wearing cotton gloves with lotion with no improvement. She reports that it seems using lotion causes more irritation than improvement. Has tried using Vaseline alone and with betamethasone with no improvement. Has tried continuing to apply betamethasone to areas of eczema for a few days after resolution without any change in the amount of time for the eczema to reappear. Reports that she has developed blisters with yellow secretions which she took a picture of on her phone. Denies having asthma or allergies.   Has  rhinoconjunctivitis throughout the fall which started a few years ago.   Has noticed hand irritation after wearing rubber gloves. Stress causes her eczema to worsen. Dyes hair. Does not pain nails. Daughter has severe allergies.   Patient brought in a list of current medications, supplements, and lotions as of November 2019: metoprolol 25 mg (2+ years), simvastatin 20mg (4+ years, synthroid 25 mcg (3 months), Maxalt 10mg (5+ years prn), betamethasone 0.05% ointment (2+ years, may just be 1+ years), hydrocortisone 2.5% (1+ years), [employer] eye vitamin, multivitamin, biotin, calcium/vitamin D, Blink eye drops, antacids for acid reflux, CeraVe, Vanicream, and Sarna lotion.    Hx since 11/27/19:  The patient comes in today for patch testing day 5. Reports that last night she developed a lesion on the L thumb that she would like examined today. The patient is otherwise feeling well overall. No other allergy concerns at this time.     Hx since 11/29/19:  The patient comes in today for follow up of delayed-reaction to her intradermal tests. Of note, there was significant infiltration and increased erythema over the test field sites of D. Farinae and D. Pteronyssinus on her R upper extremity.  She shares that this occurred about 3 days after injection.     Hx since 12/2/19:  The patient returns in clinic today for a 1 month followup. She last saw us in the allergy clinic on 12/2/19 for a delayed-reaction to dust mites. At the time she was to reduce house dust mites as much as possible in the bedroom. She was prescribed Protopic 0.1% ointment to be applied BID on the upper extremities, hands and face. She was to continue with CeraVe for hand and body wash.   Today she reports that the eczema of the hands have worsened about a week ago despite using the Protopic in the interim. She used the Protopic twice daily for about 2-3 weeks. She stopped using it on Tuesday of last week. Over the holidays she had her children wash the  dishes due to hand symptoms; denies that there was any stress exacerbating symptoms. Did not wear any nail polish or artificial nails. Did not use glue.  Other affected areas include the R elbow and L proximal upper extremity.    She shares that she also uses a mousse with spray for her hair, followed by brushing and drying of the hair.     Also reports that in the last couple of weeks she began having dry skin of the hands.     She is using betamethasone; last applied this last Tuesday.     She is working at current.    She is otherwise feeling well overall today. No other skin or allergy concerns at this time.     Hx since 12/27/19:  The patient comes in today for a 3-week followup. She was last seen in clinic on 12/27/19 for a followup. She was to continue reducing house dust mites in the bedroom and bed, continue CeraVe, Free and Clear shampoo and conditioner. She was prescribed prednisone taper, Eucrisa, and mometasone cream.    She had stopped the Protopic since it didn't work. She used Eucrisa instead, which worked for the first couple of weeks with prednisone. However, she began breaking out, notably along the medial upper extremities.  Reports that there is now swelling at joints of the fingers and she has spots on the arms, stomach, and legs.    The patient is otherwise feeling well overall. There are no other skin or allergy concerns at this time.       Past Medical History:   There is no problem list on file for this patient.    No past medical history on file.    Allergy History:     Allergies   Allergen Reactions     Amlodipine Swelling     Doxycycline Rash     Sulfamethoxazole-Trimethoprim Rash       Social History:  The patient works at the  of a physical therapy office. Patient has the following hobbies or non-occupational exposure: reads, enjoys being on her porch particularly when it is summer.     reports that she has never smoked. She has never used smokeless tobacco.      Family  History:  No family history on file.    Medications:  Current Outpatient Medications   Medication Sig Dispense Refill     betamethasone dipropionate (DIPROSONE) 0.05 % external ointment        crisaborole (EUCRISA) 2 % ointment Once to twice a day, Monday through Friday 60 g 1     Dupilumab (DUPIXENT) 300 MG/2ML syringe Inject 2 mLs (300 mg) Subcutaneous every 14 days 2 mL 11     DUPIXENT 300 MG/2ML syringe INJECT 4 ML (600 MG) UNDER THE SKIN ONCE FOR 1 DOSE AS DIRECTED 4 mL 26     Elastic Bandages & Supports (FUTURO RESTORING DRESS SOCKS) MISC Use daily for leg swelling.       hydrocortisone 2.5 % ointment        levothyroxine (SYNTHROID/LEVOTHROID) 25 MCG tablet Take 25 mcg by mouth       metoprolol succinate ER (TOPROL-XL) 25 MG 24 hr tablet Take 25 mg by mouth       mometasone (ELOCON) 0.1 % external cream Use on your hands once a day, Saturday and Sunday 15 g 0     pimecrolimus (ELIDEL) 1 % external cream Apply topically 2 times daily 30 g 3     predniSONE (DELTASONE) 5 MG tablet Start with 6 tablets/30 mg and decrease by 1 tablet/5 mg every day until all tablets are gone 21 tablet 0     rizatriptan (MAXALT) 10 MG tablet        simvastatin (ZOCOR) 20 MG tablet        tacrolimus (PROTOPIC) 0.1 % external ointment Apply topically 2 times daily On hands and arms and face    Use for hair wash Free&Clear Shampoo and Conditioner 60 g 3       Review of Systems:  -As per HPI  -Constitutional: The patient denies fatigue, fevers, chills, unintended weight loss, and night sweats.  -HEENT: Patient denies nonhealing oral sores.  -Skin: As above in HPI. No additional skin concerns.    Physical exam:  Vitals: There were no vitals taken for this visit.  GEN: This is a well developed, well-nourished female in no acute distress, in a pleasant mood.    SKIN: Focused examination of the hands were performed.  - Acute eczema on dorsal part of R hand on distal phalynx  -No other lesions of concern on areas examined     Allergy  Tests:    Order for PATCH TESTS    [] Outpatient  [] Inpatient: [facility]..../ Bed ....      Skin Atopy (atopic dermatitis) [] Yes   [x] No  Rhinitis/Sinusitis:   [x] Yes   [] No Fall  Allergic Asthma:   [] Yes   [x] No  Food Allergy:   [] Yes   [x] No  Leg ulcers:   [] Yes   [x] No  Hand eczema:   [x] Yes   [] No   Leading hand:   [x] R   [] L       [] Ambidextrous                        Reason for tests (suspected allergy): acute to subacute dermatitis on back of hands, extensor part of arms and perioral  Known previous allergies: none    Standardized panels  [x] Standard panel (40 tests)  [x] Preservatives & Antimicrobials (31 tests)  [x] Emulsifiers & Additives (25 tests)   [x] Perfumes/Flavours & Plants (25 tests)  [x] Hairdresser panel (12 tests)  [x] Rubber Chemicals (22 tests)  [] Plastics (26 tests)  [] Colorants/Dyes/Food additives (20 tests)  [] Metals (implants/dental) (24 tests)  [] Local anaesthetics/NSAIDs (13 tests)  [] Antibiotics & Antimycotics (14 tests)   [] Corticosteroids (15 tests)   [] Photopatch test (62 tests)   [] others: ...      [] Patient's own products: ...    DO NOT test if chemical or biological identity is unknown!     always ask from patient the product information and safety sheets (MSDS)   [x] Atopy screen prick test (Atopic predisposition): ...    [] Patient needs consultation with Allergy team (changes of tests may apply)  [] Tests discussed with Allergy team (can have direct appointment for patch tests)    Atopy Screen (Placed 11/25/19 )    No Substance Readings (15 min) Evaluation   POS Histamine 1mg/ml ++    NEG NaCl 0.9% -      No Substance Readings (15 min) Evaluation   1 Alternaria alternata (tenuis)  -    2 Cladosporium herbarum -    3 Aspergillus fumigatus -    4 Penicillium notatum -    5 Dermatophagoides pteronyssinus -    6 Dermatophagoides farinae -    7 Dog epithelium (canis spp) -    8 Cat hair (trinity catus) -    9 Cockroach   (Blatella americana & germanica) -    10  Grass mix midwest   (Nury, Orchard, Redtop, Raul) -    11 Cornel grass (sorghum halepense) -    12 Weed mix   (common Cocklebur, Lamb s quarters, rough redroot Pigweed, Dock/Sorrel) -    13 Mug wort (artemisia vulgare) -    14 Ragweed giant/short (ambrosia spp) -    15 English Plantain (plantago lanceolata) -    16 Tree mix 1 (Pecan, Maple BHR, Oak RVW, american Adirondack, black [place]) -    17 Red cedar (juniperus virginia) -    18 Tree mix 2   (white Mathew, river/red Birch, black [place], common [place], american Elm) -    19 Box elder/Maple mix (acer spp) -    20 Yalobusha shagbark (carya ovata) -       -      Conclusion: Negative atopy screen prick test. No atopic predisposition.      Intraderrmal Testing (Placed November 29, 2019)    No Substance Conc.  Readings (15min) Evaluation   1 NaCl  0.9% -    2 Histamine  0.1mg / ml ++ 13mmP/20mmE   4 Standard Dust Mite - D. Farinae 1:10 +/++ 7mmP/10mmE   5 Standard Dust Mite - D. Pteronyssinus 1:10 + 8mmP/8mmE   10 Aspergillus fumigatus  1:10 - 3mmP/3mmE   11 Penicillium notatum 1:10 -      Conclusion:   Patient has a slight reaction to the house dust mites which would show that she has an atopic predisposition and this could explain as well that the hand eczema is not due to a contact allergy but as a minimal of atopic dermatitis.  Delayed reactions --> 3 days after, D. Farinae has an infiltration of about 15mm and 20mm erythema and D. Pteronyssinus had infiltration of about 13mm and 15mm erythema    RESULTS & EVALUATION of PATCH TESTS    Patch test readings after     [x] 2 days, [] 3 days [x] 4 days, [] 5 days,    Applied patch tests with results (import here the list of patch tests):  Order documented by: ARACELIS JONES  Order reviewed by: Mimi Montenegro LPN   Physician:   Date/time of application: 11/25/2019  Localization of application: Back >>> no eczema or lesions     STANDARD Series         No Substance 2 days 4 days remarks   1 Jayy Mix  [C] - -    2 Colophony - -    3  2-Mercaptobenzothiazole  - -     4 Methylisothiazolinone - -    5 Carba Mix - -    6 Thiuram Mix [A] - -    7 Bisphenol A Epoxy Resin - -    8 S-Vkkf-Jltjqeuslks-Formaldehyde Resin - -    9 Mercapto Mix [A] - -    10 Black Rubber Mix- PPD [B] - -    11 Potassium Dichromate  +  -    12 Balsam of [place] (Myroxylon Pereirae Resin) - -    13 Nickel Sulphate Hexahydrate - +/++    14 Mixed Dialkyl Thiourea - -    15 Paraben Mix [B] - -    16 Methyldibromo Glutaronitrile + -    17 Fragrance Mix - -    18 2-Bromo-2-Nitropropane-1,3-Diol (Bronopol) - -    19 Lyral - -    20 Tixocortol-21- Pivalate - -    21 Diazolidiyl Urea (Germall II) - -    22 Methyl Methacrylate - -    23 Cobalt (II) Chloride Hexahydrate NA NA    24 Fragrance Mix II  - -    25 Compositae Mix - -    26 Benzoyl Peroxide - -    27 Bacitracin + +    28 Formaldehyde - -    29 Methylchloroisothiazolinone / Methylisothiazolinone - -    30 Corticosteroid Mix - -    31 Sodium Lauryl Sulfate - -    32 Lanolin Alcohol - -    33 Turpentine - -    34 Cetylstearylalcohol - -    35 Chlorhexidine Dicluconate + -    36 Budenoside - -    37 Imidazolidinyl Urea  - -    38 Ethyl-2 Cyanoacrylate NA NA    39 Quaternium 15 (Dowicil 200) - -    40 Decyl Glucoside - -      EMULSIFIERS & ADDITIVES        No Substance 2 days 4 days remarks   41 Polyethylene Glycol-400 - -    42 Cocamidopropyl Betaine - -    43 Amerchol L101 NA NA    44 Propylene Glycol - -    45 Triethanolamine - -    46 Sorbitane Sesquiolate - -    47 Isopropylmyristate - -    48 Polysorbate 80  - -    49 Amidoamine   (Stearamidopropyl Dimethylamine) - -    50 Oleamidopropyl Dimethylamine - -    51 Lauryl Glucoside - -    52 Coconut Diethanolamide  - -    53 2-Hydroxy-4-Methoxy Benzophenone (Oxybenzone) - -    54 Benzophenone-4 (Sulisobenzon) - -    55 Propolis - -    56 Dexpanthenol - -    57 Carboxymethyl Cellulose Sodium - -    58 Abitol - -    59 Tert-Butylhydroquinone - -    60  Benzyl Salicylate - -     Antioxidant      61 Dodecyl Gallate - -    62 Butylhydroxyanisole (BHA) - -    63 Butylhydroxytoluene (BHT) - -    64 Di-Alpha-Tocopherol (Vit E) - -    65 Propyl Gallate - -      PERFUMES, FLAVORS & PLANTS         No Substance 2 days 4 days remarks   66 Benzyl Salicylate - -    67 Benzyl Cinnamate - -    68 Di-Limonene (Dipentene) NA NA    69 Cananga Odorata (Christopher White) (I) - -    70 Lichen Acid Mix - -    71 Mentha Piperita Oil (Peppermint Oil) - -    72 Sesquiterpenelactone mix - -    73 Tea Tree Oil, Oxidized - -    74 Wood Tar Mix - -    75 Abietic Acid NA NA    76 Lavendula Angustifolia Oil (Lavender Oil) - -    77 Camphor  NA NA     Fragrance Mix I      78 Oakmoss Absolute - -    79 Eugenol - -    80 Geraniol - -    81 Hydroxycitronellal - -    82 Isoeugenol - -    83 Cinnamic Aldehyde - -    84 Cinnamic Alcohol  - -    85 Sorbitane Sesquioleate NA NA     Fragrance mix II      86 Citronellol - -    87 Alpha-Hexylcinnamic Aldehyde    - -    88 Citral - -    89 Farnesol - -    90 Coumarin - -      PRESERVATIVES & ANTIMICROBIALS         No Substance 2 days 4 days remarks   91  1,2-Benzisothiazoline-3-One, Sodium Salt - -    92  1,3,5-Gordon (2-Hydroxyethyl) - Hexahydrotriazine (Grotan BK) - -    93 4-Ishtktbjuffet-5-Nitro-1, 3-Propanediol - -    94  3, 4, 4' - Triclocarban - -    95 4 - Chloro - 3 - Cresol - -    96 4 - Chloro - 4 - Xylenol (PCMX) - -    97 7-Ethylbicyclooxazolidine (Bioban [id number]) - -    98 Benzalkonium Chloride - -    99 Benzyl Alcohol - -    100 Cetalkonium Chloride - -    101 Cetylpyrimidine Chloride  - -    102 Chloroacetamide - -    103 DMDM Hydantoin - -    104 Glutaraldehyde - -    105 Triclosan NA NA    106 Glyoxal Trimeric Dihydrate - -    107 Iodopropynyl Butylcarbamate - -    108 Octylisothiazoline - -    109 Iodoform - -    110 (Nitrobutyl) Morpholine/(Ethylnitro-Trimethylene) Dimorpholine ([facility] P 1487) - -    111 Phenoxyethanol - -    112 Phenyl  Salicylate - -    113 Povidone Iodine - -    114 Sodium Benzoate - -    115 Sodium Disulfite - -    116 Sorbic Acid NA NA    117 Thimerosal - -     Parabens      118 Butyl-P-Hydroxybenzoate - -    119 Ethyl-P-Hydroxybenzoate - -    120 Methyl-P-Hydroxybenzoate - -    121 Propyl-P-Hydroxybenzoate - -      RUBBER CHEMICALS         No Substance 2 days 4 days remarks    Carbamate      122 Zinc Bis ( Diethyldithio carbamate ) (ZDEC) - -    123 Zinc Bis (Dibutyldithiocarbamate) - -    124 1,3-Diphenylguanidine (DPG) - -     Thiourea      125 Dibutylthiourea - -    126 Diphenyltiourea - -    127 Thiourea - -     Mercapto chemicals      128 Morpholinyl Mercaptobenzothiazole - -    129 F-Bgaehlvjtf-0-Benzothiazyl-Sulfenamide - -    130 Dibenzothiazyl Disulfide - -     Thiuram chemicals      131 Dipentamethylenethiuram Disulfide - -    132 Tetraethylthiuram Disulfide (Disulfiram) - -    133 Tetramethylthiuram Disulfide - -    134 Tetramethyl Thiuram Monosulfide (TMTM) - -     4-Phenylenediamine derivatives      135 N-Isopropyl-N'-Phenyl-P-Phenylenediamine (IPPD) - -    136 Thdqijku-N-Copxzlnxmkmfhkqa (DPPD) - -     Various Rubber Additives      137 Hydroquinone Monobenzylether  - -    138 Hexamethylenetetramine - -    139 4,4'-Dihydroxybiphenyl - -    140 Cyclohexylthiophtalimide - -    141 Ethylenediamine Dihydrochloride - -    142 N-Phenyl-B-Naphthylamine - -    143 Dodecyl Mercaptan - -      HAIRDRESSER Series         No Substance 2 days 4 days remarks   144 P-Phenylenediamin  -  -    145 P-Toluenediamine Sulphate  -  -    146 Ammonium Thioglycolate - -    147 Ammonium Persulfate - -    148 Resorcinol - -    149 3-Aminophenol - -    150 P-Aminophenol - -    151 Glyceryl Monothioglycolate - -    152 Zinc Pyrithione  - -    153 Pyrogallol - -    154 P-Aminodiphenylamine - -    155 Dimethylaminopropylamin (DMAPA) - -      Results of patch tests:                         Interpretation:    - Negative                    A    =  Allergic      (+) Erythema    TI   = Toxic/irritant   + E + Infiltration    RaP = Relevance at Present     ++ E/I + Papulovesicle   Rpr  = Relevance Previously     +++ E/I/P + Blister     nR   = No Relevance    [x] Relevant allergic reaction observed:      Nickel Sulphate Hexahydrate +/++    Bacitracin +     Immediate and delayed type reaction to house dust mites!      Interpretation/ remarks:   Patient seems to have a contact sensitization to the nickel and less to bacitracin. The nickel allergy would fit well into the atopic dermatitis but it probably doesn't have relevance for the dermatitis on the hands and in the face, as well for the bacitracin the patient doesn't really use this.    ==> final Diagnosis:    >> Chronic recurrent eczema on hands and in face as part of atopic dermatitis and with irritant component     Strong immediate and delayed type sensitivity to the house dust mite D. Farinae and D. 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 Pteronyssinus and D. Farinae negatives and total IgE not elevated    ==> Treatment prescribed/Plan:    Discussed with patient following: Because patient not sure if Dupixent is involved in the general reduction of health with sore throat, low blood pressure and heart beating, we discussed to stop for the moment the Dupixent (more pause), but it is unlikely that Dupixent is responsible for these symptoms, particularly because they are not specific for Dupixent side effects and after last injection no increase of symptoms.    No not much symptoms on skin and therefore continue with Eucrisa to be applied to affected areas from Monday through Friday 1-2x daily (worked better than Protopic). Continue mometasone cream to be used twice weekly on Saturday and Sunday.    We previously discussed with the patient IT, but we think it will not be effective in this case because there is as well a  delayed-type sensitization to house dust mites and therefore immunotherapy not recommended    Continue to reduce house dust mites in the bedroom and bed as much as possible. Information was previously given. --> House dust mite reduction was started about a month ago as of today.    Continue moisturizing treatment to rebuild the skin barrier like CeraVe for hand and body wash. Continue with Free and Clear shampoo and conditioner.    --> Discussed with the patient to pause for the moment Dupixent and start it if the skin symptoms would be more extensive (maintenance dose of 300 mg every two weeks).    These conclusions are made at the best of one's knowledge and belief based on the provided evidence such as patient's history and allergy test results and they can change over time or can be incomplete because of missing information's.    CC: Dr. Lundberg         Date of Service: 3/31/2020   Technology utilized: Telephone  Phone call contact time: Call Started at: 10:26; Call Ended at: 10:48    Patient opted to conduct today's return visit via telephone vs. an in person visit to the clinic.  The patient provided verbal consent for treatment and understands that insurance may be billed for the remote encounter.     I spoke with: Kaley Magana     The reason for the telephone visit was:Dermatologic surgery consultation    Pertinent history and review of systems: Kaley Magana is a 64 year old female who presents in telephone consultation for discussion of treatment. Patient started the first Dupixent injection on Feb 28th and had 3rd and last injection this Saturday 27th of March. First 2 weeks were fine without problems and after 2nd injection the skin improved.  On the last injection on Saturday 27th morning around 10am   About 1 week after starting Dupixent patient developed low blood pressure, heart beating, cold sores, sore throat, and dry eyes. The patient went to ED on Friday before the last Dupixent injection and  there only hyperinflated lungs and after last injection no increase of symptoms.    The skin in the moment OK and no lesions on the hands    Exam:   No photos sent    Staff Involved:  Mimi Montenegro LPN    I spent a total of 22 minutes on phone with Kaley Magana during today s phone consult. Over 50% of this time was spent counseling the patient and/or coordinating care. Please see Assessment and Plan for details.        Billing consideration    04644 = 21-30 minutes of medical discussion; Private payer only-No Medicare

## 2020-03-31 NOTE — NURSING NOTE
Allergy Rooming Note    Kaley Magana's goals for this visit include:   Chief Complaint   Patient presents with     Allergy Recheck     Kaley is here for an allergy follow up post dupixent start (feb, 3 injections total)        Mimi Montenegro LPN

## 2020-04-08 NOTE — PATIENT INSTRUCTIONS
==> final Diagnosis:     >> Chronic recurrent eczema on hands and in face as part of atopic dermatitis and with irritant component   ? Strong immediate and delayed type sensitivity to the house dust mite D. Farinae and D. Pteronyssinus  ? Contact allergy  to nickel and bacitracin (reaction to nickel correlates well with atopy)   ? Specific IgE to D. Pteronyssinus and D. Farinae negatives and total IgE not elevated     ==> Treatment prescribed/Plan:    Discussed with patient following: Because patient not sure if Dupixent is involved in the general reduction of health with sore throat, low blood pressure and heart beating, we discussed to stop for the moment the Dupixent (more pause), but it is unlikely that Dupixent is responsible for these symptoms, particularly because they are not specific for Dupixent side effects and after last injection no increase of symptoms.    No not much symptoms on skin and therefore continue with Eucrisa to be applied to affected areas from Monday through Friday 1-2x daily (worked better than Protopic). Continue mometasone cream to be used twice weekly on Saturday and Sunday.    We previously discussed with the patient IT, but we think it will not be effective in this case because there is as well a delayed-type sensitization to house dust mites and therefore immunotherapy not recommended    Continue to reduce house dust mites in the bedroom and bed as much as possible. Information was previously given. --> House dust mite reduction was started about a month ago as of today.    Continue moisturizing treatment to rebuild the skin barrier like CeraVe for hand and body wash. Continue with Free and Clear shampoo and conditioner.     --> Discussed with the patient to pause for the moment Dupixent and start it if the skin symptoms would be more extensive (maintenance dose of 300 mg every two weeks).     These conclusions are made at the best of one's knowledge and belief based on the provided  evidence such as patient's history and allergy test results and they can change over time or can be incomplete because of missing information's.     CC: Dr. Lundberg

## 2020-05-14 ENCOUNTER — DOCUMENTATION ONLY (OUTPATIENT)
Dept: CARE COORDINATION | Facility: CLINIC | Age: 65
End: 2020-05-14

## 2020-07-07 ENCOUNTER — TELEPHONE (OUTPATIENT)
Dept: ALLERGY | Facility: CLINIC | Age: 65
End: 2020-07-07

## 2020-07-07 NOTE — TELEPHONE ENCOUNTER
Rec'd a call on the Central P/A line from Loida @ [facility], stating that the current P/A for Dupixent Syringe had  on 20 and they are needing it renewed by calling [phone].  *Please verify current dose. Pharmacy is requesting 2ml every 14 days, but there is a newer order on file as well for 4ml once for one dose as directed, then route back to our team and we will begin P/A.    Prior Authorization Retail Medication Request    Medication/Dose: Dupilumab (DUPIXENT) 300 MG/2ML syringe   ICD code (if different than what is on RX):    Previously Tried and Failed:    Rationale:      Insurance Name:  [employer]  Insurance ID: [id number]      Pharmacy Information (if different than what is on RX)  Name[place]  [place]  Phone:  [phone]

## 2020-07-08 NOTE — TELEPHONE ENCOUNTER
[facility] Call Center    Phone Message    May a detailed message be left on voicemail: yes     Reason for Call: Medication Question or concern regarding medication   Prescription Clarification  Name of Medication: Dupilumab (DUPIXENT) 300 MG/2ML syringe   Prescribing Provider: Dr Pace   Pharmacy: [address] (Pharmacy)   What on the order needs clarification?  Pharmacy calling to get the PA done over the phone. (case # [id number])  Lawrence says you can call and get a verbal over the phone for the PA with the case number and the phone number is [phone].          Action Taken: Message routed to:  [facility]    Travel Screening: Not Applicable

## 2020-07-08 NOTE — TELEPHONE ENCOUNTER
Per last office note with Dr. Pace, patient is pausing the use of Dupixent. LVM for patient to discuss if she is using this medication at the moment.

## 2020-07-09 NOTE — TELEPHONE ENCOUNTER
Per provider, patient should continue with Dupixent 300 mg/2 ml every 2 weeks. Routed to Pharmacy Liaison to begin PA process.

## 2020-07-14 NOTE — TELEPHONE ENCOUNTER
PA approved for 4 months.  Expires 11/14/2020.  Approval number: [id number]    Pharmacy will reach out to the patient.

## 2020-08-07 ENCOUNTER — VIRTUAL VISIT (OUTPATIENT)
Dept: DERMATOLOGY | Facility: CLINIC | Age: 65
End: 2020-08-07
Payer: COMMERCIAL

## 2020-08-07 DIAGNOSIS — L30.9 ECZEMA, UNSPECIFIED TYPE: ICD-10-CM

## 2020-08-07 DIAGNOSIS — L20.81 ATOPIC NEURODERMATITIS: ICD-10-CM

## 2020-08-07 DIAGNOSIS — L20.89 OTHER ATOPIC DERMATITIS: ICD-10-CM

## 2020-08-07 RX ORDER — MOMETASONE FUROATE 1 MG/G
CREAM TOPICAL
Qty: 45 G | Refills: 3 | Status: SHIPPED | OUTPATIENT
Start: 2020-08-07

## 2020-08-07 ASSESSMENT — PAIN SCALES - GENERAL: PAINLEVEL: NO PAIN (0)

## 2020-08-07 NOTE — NURSING NOTE
Dermatology Rooming Note    Kaley Magana's goals for this visit include:   Chief Complaint   Patient presents with     Derm Problem     Dermatitis - Kaley states she has been stable      Sarah Jiang, BOB

## 2020-08-07 NOTE — LETTER
8/7/2020       RE: Kaley Magana  [address] [place] [address]     Dear Colleague,    Thank you for referring your patient, Kaley Magana, to the [facility] DERMATOLOGY at [facility]. Please see a copy of my visit note below.    [facility] Dermatology Record:  [facility]    Dermatology Problem List:  1. Allergic contact dermatitis  - patch testing (1/2020): nickel++  - current rx: Eucrisa, mometasone, dupilumab    Encounter Date: Aug 7, 2020    CC:   Chief Complaint   Patient presents with     Derm Problem     Dermatitis - Kaley states she has been stable        History of Present Illness:  Kaley Magana is a 64 year old female who presents for allergic contact dermatitis and hand dermatitis  - last seen for virtual visit with Dr. Pace 3/2020  - getting a bit of flare up on the hands  - has had long-standing history   - was using dupilumab starting 1/2020 until 3/2020, unsure if it helped much  - recently just started back on it  - using Eucrisa and mometasone which are helping  - no previous history of childhood eczema, allergic rhinoconjunctivitis    ROS: Patient is generally feeling well today     Physical Examination:  General: Well-appearing, appropriately-developed individual.  Skin: N/A    Past Medical History:   There is no problem list on file for this patient.    No past medical history on file.  No past surgical history on file.    Social History:  Patient reports that she has never smoked. She has never used smokeless tobacco.    Family History:  Family History   Problem Relation Age of Onset     Skin Cancer Sister      Melanoma No family hx of        Medications:  Current Outpatient Medications   Medication     crisaborole (EUCRISA) 2 % ointment     Dupilumab (DUPIXENT) 300 MG/2ML syringe     DUPIXENT 300 MG/2ML syringe     levothyroxine (SYNTHROID/LEVOTHROID) 25 MCG tablet     metoprolol succinate ER (TOPROL-XL) 25 MG 24 hr tablet      simvastatin (ZOCOR) 20 MG tablet     betamethasone dipropionate (DIPROSONE) 0.05 % external ointment     Elastic Bandages & Supports (FUTURO RESTORING DRESS SOCKS) MISC     hydrocortisone 2.5 % ointment     mometasone (ELOCON) 0.1 % external cream     pimecrolimus (ELIDEL) 1 % external cream     predniSONE (DELTASONE) 5 MG tablet     rizatriptan (MAXALT) 10 MG tablet     tacrolimus (PROTOPIC) 0.1 % external ointment     No current facility-administered medications for this visit.           Allergies   Allergen Reactions     Amlodipine Swelling     Doxycycline Rash     Sulfamethoxazole-Trimethoprim Rash       Impression and Recommendations (Patient Counseled on the Following):  1. Allergic contact dermatitis due to nickel with possible component of intrinsic hand dermatitis  - mometasone and Eucrisa daily PRN  - dupilumab 300 mg every 2 weeks  - avoid nickel    Follow up: 3 months with Dr. Pace    Faculty: Dr. Lundberg    Staff involved: resident and staff    Darby Chowdary MD  Dermatology Resident  [facility]  I was present during the key portions of the history and physical exam.  I reviewed the history and examined the patient and edited this chart note.    I agree with the treatment plan. CORTES Lundberg MD  ==========================================    Teledermatology information:  - Location of patient: [place]  - Patient presented as: return  - Location of teledermatologist:  ([facility] DERMATOLOGY )  - Reason teledermatology is appropriate:  patient desires expedited evaluation,  - Image quality and interpretability: N/A  - Physician has received verbal consent for a Video/Photos Visit from the patient? Yes  - In-person dermatology visit recommendation: no  - Date of images: N/A  - Service start time:7:45 AM  - Service end time: 8:02 AM  - Date of report: 8/7/2020     Again, thank you for allowing me to participate in the care of your patient.      Sincerely,    Osmany Lundberg MD

## 2020-08-07 NOTE — PROGRESS NOTES
[facility]    Dermatology Problem List:  1. Allergic contact dermatitis  - patch testing (1/2020): nickel++  - current rx: Eucrisa, mometasone, dupilumab    Encounter Date: Aug 7, 2020    CC:   Chief Complaint   Patient presents with     Derm Problem     Dermatitis - Kaley states she has been stable        History of Present Illness:  Kaley Magana is a 64 year old female who presents for allergic contact dermatitis and hand dermatitis  - last seen for virtual visit with Dr. Pace 3/2020  - getting a bit of flare up on the hands  - has had long-standing history   - was using dupilumab starting 1/2020 until 3/2020, unsure if it helped much  - recently just started back on it  - using Eucrisa and mometasone which are helping  - no previous history of childhood eczema, allergic rhinoconjunctivitis    ROS: Patient is generally feeling well today     Physical Examination:  General: Well-appearing, appropriately-developed individual.  Skin: N/A    Past Medical History:   There is no problem list on file for this patient.    No past medical history on file.  No past surgical history on file.    Social History:  Patient reports that she has never smoked. She has never used smokeless tobacco.    Family History:  Family History   Problem Relation Age of Onset     Skin Cancer Sister      Melanoma No family hx of        Medications:  Current Outpatient Medications   Medication     crisaborole (EUCRISA) 2 % ointment     Dupilumab (DUPIXENT) 300 MG/2ML syringe     DUPIXENT 300 MG/2ML syringe     levothyroxine (SYNTHROID/LEVOTHROID) 25 MCG tablet     metoprolol succinate ER (TOPROL-XL) 25 MG 24 hr tablet     simvastatin (ZOCOR) 20 MG tablet     betamethasone dipropionate (DIPROSONE) 0.05 % external ointment     Elastic Bandages & Supports (FUTURO RESTORING DRESS SOCKS) MISC     hydrocortisone 2.5 % ointment     mometasone (ELOCON) 0.1 % external cream     pimecrolimus (ELIDEL) 1 % external  cream     predniSONE (DELTASONE) 5 MG tablet     rizatriptan (MAXALT) 10 MG tablet     tacrolimus (PROTOPIC) 0.1 % external ointment     No current facility-administered medications for this visit.           Allergies   Allergen Reactions     Amlodipine Swelling     Doxycycline Rash     Sulfamethoxazole-Trimethoprim Rash       Impression and Recommendations (Patient Counseled on the Following):  1. Allergic contact dermatitis due to nickel with possible component of intrinsic hand dermatitis  - mometasone and Eucrisa daily PRN  - dupilumab 300 mg every 2 weeks  - avoid nickel    Follow up: 3 months with Dr. Pace    Faculty: Dr. Lundberg    Staff involved: resident and staff    Darby Chowdary MD  Dermatology Resident  [facility]  I was present during the key portions of the history and physical exam.  I reviewed the history and examined the patient and edited this chart note.    I agree with the treatment plan. CORTES Lundberg MD  ==========================================    Teledermatology information:  - Location of patient: [place]  - Patient presented as: return  - Location of teledermatologist:  ([facility] DERMATOLOGY )  - Reason teledermatology is appropriate:  patient desires expedited evaluation,  - Image quality and interpretability: N/A  - Physician has received verbal consent for a Video/Photos Visit from the patient? Yes  - In-person dermatology visit recommendation: no  - Date of images: N/A  - Service start time:7:45 AM  - Service end time: 8:02 AM  - Date of report: 8/7/2020

## 2020-08-07 NOTE — PATIENT INSTRUCTIONS
[facility] Dermatology Visit    Thank you for allowing us to participate in your care. Your findings, instructions and follow-up plan are as follows:    [email or web address]    When should I call my doctor?    If you are worsening or not improving, please, contact us or seek urgent care as noted below.     Who should I call with questions (adults)?    [facility] (adult and pediatric): [phone]     [facility] (adult): [phone]    For urgent needs outside of business hours call the [facility] at [phone] and ask for the dermatology resident on call    If this is a medical emergency and you are unable to reach an ER, Call [phone]      Who should I call with questions (pediatric)?  [facility]- Pediatric Dermatology  Dr. Ingrid Lay, Dr. Onofre Townsend, Dr. Amina Arriaga, Kinjal Blackburn, [place]  Dr. Faith Willis, Dr. Priscila Oneill & Dr. Lawrence Lew  Non Urgent  Nurse Triage Line; [phone]- Dede and Rosa RN Care Coordinators   Tina (/Complex ) [phone]    If you need a prescription refill, please contact your pharmacy. Refills are approved or denied by our Physicians during normal business hours, Monday through Fridays  Per office policy, refills will not be granted if you have not been seen within the past year (or sooner depending on your child's condition)    Scheduling Information:  Pediatric Appointment Scheduling and Call Center [phone]  Radiology Scheduling- [phone]  Sedation Unit Scheduling- [phone]  [place] Scheduling- General [phone]; Pediatric Dermatology [phone]  Main  Services: [phone]  Greenlandic: [phone]  Citizen of [place]: [phone]  Hmong/Pashto/Jean Claude: [phone]  Preadmission Nursing Department Fax Number: [phone] (Fax all pre-operative  paperwork to this number)    For urgent matters arising during evenings, weekends, or holidays that cannot wait for normal business hours please call [phone] and ask for the Dermatology Resident On-Call to be paged.

## 2020-08-12 DIAGNOSIS — L20.81 ATOPIC NEURODERMATITIS: ICD-10-CM

## 2020-08-12 RX ORDER — DUPILUMAB 300 MG/2ML
300 INJECTION, SOLUTION SUBCUTANEOUS
Qty: 12 ML | Refills: 4 | Status: SHIPPED | OUTPATIENT
Start: 2020-08-12

## 2020-08-12 NOTE — TELEPHONE ENCOUNTER
Routing to TOAN to resend correct Dupixent dose.    Maintenance dose needed.    Starter dose was sent:  Dupilumab (DUPIXENT) 300 MG/2ML syringe  2 mL  8  8/7/2020 8/7/2020  --    Sig - Route: Inject 2 mLs (300 mg) Subcutaneous once for 1 dose - Subcutaneous

## 2020-08-12 NOTE — TELEPHONE ENCOUNTER
Received refill request for Dupixent. Patient chart reviewed. Refill accepted. Rx routed to Dr. Sanjeev Chowdary MD  Dermatology Resident  [facility]

## 2020-08-12 NOTE — TELEPHONE ENCOUNTER
[facility] Call Center    Phone Message    May a detailed message be left on voicemail: yes     Reason for Call: Medication Question or concern regarding medication   Prescription Clarification  Name of Medication: Dupilumab (DUPIXENT) 300 MG/2ML syringe    Prescribing Provider: Dr Lundberg       Pharmacy: [address]      What on the order needs clarification?     Frankiejosefinaraymon called from [facility] [place] needing clarification for Dupixent direction and frequency. Call back # [phone] opt 1 then opt 6.           Action Taken: Message routed to:  [facility] derm    Travel Screening: Not Applicable

## 2020-08-31 ENCOUNTER — TELEPHONE (OUTPATIENT)
Dept: DERMATOLOGY | Facility: CLINIC | Age: 65
End: 2020-08-31

## 2020-08-31 NOTE — TELEPHONE ENCOUNTER
Central Prior Authorization Team   Phone: [phone]        PA Initiation    Medication: Dupixent -PA initiated  Insurance Company: [employer] - Phone [phone] Fax [phone]  Pharmacy Filling the Rx: [facility] - [address] [place] [address]  Filling Pharmacy Phone: [phone]  Filling Pharmacy Fax:    Start Date: 8/31/2020

## 2020-08-31 NOTE — TELEPHONE ENCOUNTER
Prior Authorization Retail Medication Request    Medication/Dose: Dupilumab (DUPIXENT) 300 MG/2ML syringe  ICD code (if different than what is on RX):  L20.81  Previously Tried and Failed:  See Chart  Rationale:  See chart     Insurance Name:  [facility]  Insurance ID:  [id number]       Pharmacy Information (if different than what is on RX)  Name:  [facility]  Phone:  [phone]

## 2020-09-02 NOTE — TELEPHONE ENCOUNTER
Prior Authorization Not Needed per Insurance    Medication: Dupixent -PA Not Needed  Insurance Company: [employer] - Phone [phone] Fax [phone]  Expected CoPay:      Pharmacy Filling the Rx: [facility] [place] - [address] [place] [address]  Pharmacy Notified: Yes-I let them know this does not need a PA, but they said it does. I was transferred to the PA People. I spoke to Sim at [employer] who confirmed this does not need a PA. They originally tried to fill on 8/17 which was too soon, so patient could only get 8 mL. It is fillable for full quantity on 8/28. They do not have a rejected claim.  Patient Notified: No-Pharmacy will contact

## 2021-01-03 ENCOUNTER — HEALTH MAINTENANCE LETTER (OUTPATIENT)
Age: 66
End: 2021-01-03

## 2021-03-09 ENCOUNTER — IMMUNIZATION (OUTPATIENT)
Dept: NURSING | Facility: CLINIC | Age: 66
End: 2021-03-09
Payer: COMMERCIAL

## 2021-03-09 PROCEDURE — 91301 PR COVID VAC MODERNA 100 MCG/0.5 ML IM: CPT

## 2021-03-09 PROCEDURE — 0011A PR COVID VAC MODERNA 100 MCG/0.5 ML IM: CPT

## 2021-04-06 ENCOUNTER — IMMUNIZATION (OUTPATIENT)
Dept: NURSING | Facility: CLINIC | Age: 66
End: 2021-04-06
Attending: INTERNAL MEDICINE
Payer: COMMERCIAL

## 2021-04-06 PROCEDURE — 0012A PR COVID VAC MODERNA 100 MCG/0.5 ML IM: CPT

## 2021-04-06 PROCEDURE — 91301 PR COVID VAC MODERNA 100 MCG/0.5 ML IM: CPT

## 2021-04-25 ENCOUNTER — HEALTH MAINTENANCE LETTER (OUTPATIENT)
Age: 66
End: 2021-04-25

## 2021-10-10 ENCOUNTER — HEALTH MAINTENANCE LETTER (OUTPATIENT)
Age: 66
End: 2021-10-10

## 2021-12-05 ENCOUNTER — HEALTH MAINTENANCE LETTER (OUTPATIENT)
Age: 66
End: 2021-12-05

## 2022-04-14 ENCOUNTER — TRANSFERRED RECORDS (OUTPATIENT)
Dept: HEALTH INFORMATION MANAGEMENT | Facility: CLINIC | Age: 67
End: 2022-04-14
Payer: COMMERCIAL

## 2022-05-22 ENCOUNTER — HEALTH MAINTENANCE LETTER (OUTPATIENT)
Age: 67
End: 2022-05-22

## 2022-09-18 ENCOUNTER — HEALTH MAINTENANCE LETTER (OUTPATIENT)
Age: 67
End: 2022-09-18

## 2023-06-04 ENCOUNTER — HEALTH MAINTENANCE LETTER (OUTPATIENT)
Age: 68
End: 2023-06-04

## 2023-12-17 ENCOUNTER — HEALTH MAINTENANCE LETTER (OUTPATIENT)
Age: 68
End: 2023-12-17